# Patient Record
Sex: MALE | Race: WHITE | NOT HISPANIC OR LATINO | Employment: OTHER | URBAN - METROPOLITAN AREA
[De-identification: names, ages, dates, MRNs, and addresses within clinical notes are randomized per-mention and may not be internally consistent; named-entity substitution may affect disease eponyms.]

---

## 2012-01-26 ASSESSMENT — MIFFLIN-ST. JEOR: SCORE: 1333.92

## 2012-11-27 LAB
CREAT SERPL-MCNC: 1.11 MG/DL (ref 0.72–1.25)
GLUCOSE BLD-MCNC: 100 MG/DL (ref 65–100)

## 2017-05-25 ENCOUNTER — OFFICE VISIT - RIVER FALLS (OUTPATIENT)
Dept: FAMILY MEDICINE | Facility: CLINIC | Age: 82
End: 2017-05-25

## 2017-05-25 ENCOUNTER — COMMUNICATION - RIVER FALLS (OUTPATIENT)
Dept: FAMILY MEDICINE | Facility: CLINIC | Age: 82
End: 2017-05-25

## 2017-06-01 ENCOUNTER — OFFICE VISIT - RIVER FALLS (OUTPATIENT)
Dept: FAMILY MEDICINE | Facility: CLINIC | Age: 82
End: 2017-06-01

## 2017-06-01 ENCOUNTER — COMMUNICATION - RIVER FALLS (OUTPATIENT)
Dept: FAMILY MEDICINE | Facility: CLINIC | Age: 82
End: 2017-06-01

## 2017-06-01 ASSESSMENT — MIFFLIN-ST. JEOR: SCORE: 1421.8

## 2017-06-07 LAB
ANA SER QL IA: POSITIVE
ANA TITR SER IF: ABNORMAL TITER
CREAT SERPL-MCNC: 1.36 MG/DL (ref 0.7–1.11)
GLUCOSE BLD-MCNC: 142 MG/DL (ref 65–99)

## 2017-06-15 ENCOUNTER — OFFICE VISIT - RIVER FALLS (OUTPATIENT)
Dept: FAMILY MEDICINE | Facility: CLINIC | Age: 82
End: 2017-06-15

## 2017-08-08 ENCOUNTER — AMBULATORY - RIVER FALLS (OUTPATIENT)
Dept: FAMILY MEDICINE | Facility: CLINIC | Age: 82
End: 2017-08-08

## 2017-12-07 ENCOUNTER — OFFICE VISIT - RIVER FALLS (OUTPATIENT)
Dept: FAMILY MEDICINE | Facility: CLINIC | Age: 82
End: 2017-12-07

## 2017-12-07 ENCOUNTER — COMMUNICATION - RIVER FALLS (OUTPATIENT)
Dept: FAMILY MEDICINE | Facility: CLINIC | Age: 82
End: 2017-12-07

## 2017-12-07 ASSESSMENT — MIFFLIN-ST. JEOR: SCORE: 1424.52

## 2017-12-11 ENCOUNTER — OFFICE VISIT - RIVER FALLS (OUTPATIENT)
Dept: FAMILY MEDICINE | Facility: CLINIC | Age: 82
End: 2017-12-11

## 2017-12-11 ASSESSMENT — MIFFLIN-ST. JEOR: SCORE: 1431.78

## 2017-12-12 ENCOUNTER — OFFICE VISIT - RIVER FALLS (OUTPATIENT)
Dept: FAMILY MEDICINE | Facility: CLINIC | Age: 82
End: 2017-12-12

## 2017-12-15 ENCOUNTER — OFFICE VISIT - RIVER FALLS (OUTPATIENT)
Dept: FAMILY MEDICINE | Facility: CLINIC | Age: 82
End: 2017-12-15

## 2017-12-24 ENCOUNTER — OFFICE VISIT - RIVER FALLS (OUTPATIENT)
Dept: FAMILY MEDICINE | Facility: CLINIC | Age: 82
End: 2017-12-24

## 2018-04-21 ENCOUNTER — OFFICE VISIT - RIVER FALLS (OUTPATIENT)
Dept: FAMILY MEDICINE | Facility: CLINIC | Age: 83
End: 2018-04-21

## 2018-04-21 ASSESSMENT — MIFFLIN-ST. JEOR: SCORE: 1430.87

## 2018-06-04 ENCOUNTER — OFFICE VISIT - RIVER FALLS (OUTPATIENT)
Dept: FAMILY MEDICINE | Facility: CLINIC | Age: 83
End: 2018-06-04

## 2018-06-04 ASSESSMENT — MIFFLIN-ST. JEOR: SCORE: 1417.26

## 2018-10-11 ENCOUNTER — AMBULATORY - RIVER FALLS (OUTPATIENT)
Dept: FAMILY MEDICINE | Facility: CLINIC | Age: 83
End: 2018-10-11

## 2019-08-09 ENCOUNTER — OFFICE VISIT - RIVER FALLS (OUTPATIENT)
Dept: FAMILY MEDICINE | Facility: CLINIC | Age: 84
End: 2019-08-09

## 2019-09-04 ENCOUNTER — OFFICE VISIT - RIVER FALLS (OUTPATIENT)
Dept: FAMILY MEDICINE | Facility: CLINIC | Age: 84
End: 2019-09-04

## 2019-09-04 LAB
ERYTHROCYTE [DISTWIDTH] IN BLOOD BY AUTOMATED COUNT: 13.5 % (ref 11.5–15.5)
HCT VFR BLD AUTO: 36.3 % (ref 37–53)
HGB BLD-MCNC: 12.5 G/DL (ref 13.5–17.5)
MCH RBC QN AUTO: 30.9 PG (ref 26–34)
MCHC RBC AUTO-ENTMCNC: 34.4 G/DL (ref 32–36)
MCV RBC AUTO: 90 FL (ref 80–100)
PLATELET # BLD AUTO: 142 10*3/UL (ref 140–440)
PMV BLD: 9.8 FL (ref 6.5–11)
RBC # BLD AUTO: 4.05 10*6/UL (ref 4.3–5.9)
WBC # BLD AUTO: 5.3 10*3/UL (ref 4.5–11)

## 2019-11-12 ENCOUNTER — AMBULATORY - RIVER FALLS (OUTPATIENT)
Dept: FAMILY MEDICINE | Facility: CLINIC | Age: 84
End: 2019-11-12

## 2020-10-15 ENCOUNTER — AMBULATORY - RIVER FALLS (OUTPATIENT)
Dept: FAMILY MEDICINE | Facility: CLINIC | Age: 85
End: 2020-10-15

## 2021-09-08 ENCOUNTER — OFFICE VISIT - RIVER FALLS (OUTPATIENT)
Dept: FAMILY MEDICINE | Facility: CLINIC | Age: 86
End: 2021-09-08

## 2021-09-22 ENCOUNTER — OFFICE VISIT - RIVER FALLS (OUTPATIENT)
Dept: FAMILY MEDICINE | Facility: CLINIC | Age: 86
End: 2021-09-22

## 2022-02-12 VITALS
OXYGEN SATURATION: 98 % | SYSTOLIC BLOOD PRESSURE: 127 MMHG | WEIGHT: 178.8 LBS | SYSTOLIC BLOOD PRESSURE: 150 MMHG | HEIGHT: 68 IN | BODY MASS INDEX: 27.01 KG/M2 | DIASTOLIC BLOOD PRESSURE: 72 MMHG | BODY MASS INDEX: 26.29 KG/M2 | DIASTOLIC BLOOD PRESSURE: 63 MMHG | WEIGHT: 176.6 LBS | HEART RATE: 56 BPM | SYSTOLIC BLOOD PRESSURE: 132 MMHG | DIASTOLIC BLOOD PRESSURE: 88 MMHG | TEMPERATURE: 98.7 F | WEIGHT: 174.2 LBS | SYSTOLIC BLOOD PRESSURE: 134 MMHG | HEART RATE: 92 BPM | HEART RATE: 84 BPM | HEART RATE: 72 BPM | HEIGHT: 68 IN | DIASTOLIC BLOOD PRESSURE: 72 MMHG | BODY MASS INDEX: 26.99 KG/M2 | BODY MASS INDEX: 26.76 KG/M2 | TEMPERATURE: 98.4 F | TEMPERATURE: 97.8 F | WEIGHT: 178.2 LBS | TEMPERATURE: 98.9 F

## 2022-02-12 VITALS
TEMPERATURE: 97.6 F | BODY MASS INDEX: 26.28 KG/M2 | HEART RATE: 72 BPM | SYSTOLIC BLOOD PRESSURE: 120 MMHG | WEIGHT: 174.12 LBS | DIASTOLIC BLOOD PRESSURE: 72 MMHG

## 2022-02-12 VITALS
WEIGHT: 172.8 LBS | BODY MASS INDEX: 26.08 KG/M2 | HEART RATE: 69 BPM | TEMPERATURE: 97.7 F | OXYGEN SATURATION: 98 % | SYSTOLIC BLOOD PRESSURE: 100 MMHG | DIASTOLIC BLOOD PRESSURE: 58 MMHG

## 2022-02-12 VITALS
TEMPERATURE: 96.3 F | SYSTOLIC BLOOD PRESSURE: 136 MMHG | HEIGHT: 67 IN | WEIGHT: 161 LBS | BODY MASS INDEX: 25.27 KG/M2 | DIASTOLIC BLOOD PRESSURE: 72 MMHG | HEART RATE: 68 BPM

## 2022-02-12 VITALS
DIASTOLIC BLOOD PRESSURE: 70 MMHG | HEART RATE: 72 BPM | TEMPERATURE: 97.8 F | WEIGHT: 171.8 LBS | SYSTOLIC BLOOD PRESSURE: 140 MMHG

## 2022-02-12 VITALS
DIASTOLIC BLOOD PRESSURE: 60 MMHG | TEMPERATURE: 99 F | WEIGHT: 176 LBS | HEIGHT: 68 IN | BODY MASS INDEX: 26.67 KG/M2 | SYSTOLIC BLOOD PRESSURE: 140 MMHG | HEART RATE: 76 BPM

## 2022-02-12 VITALS
BODY MASS INDEX: 26.72 KG/M2 | WEIGHT: 177 LBS | DIASTOLIC BLOOD PRESSURE: 72 MMHG | OXYGEN SATURATION: 100 % | BODY MASS INDEX: 26.82 KG/M2 | SYSTOLIC BLOOD PRESSURE: 144 MMHG | SYSTOLIC BLOOD PRESSURE: 130 MMHG | OXYGEN SATURATION: 99 % | TEMPERATURE: 97.2 F | DIASTOLIC BLOOD PRESSURE: 70 MMHG | HEART RATE: 67 BPM | HEART RATE: 61 BPM | TEMPERATURE: 97.5 F | WEIGHT: 177.7 LBS

## 2022-02-12 VITALS
WEIGHT: 178 LBS | OXYGEN SATURATION: 98 % | HEIGHT: 68 IN | SYSTOLIC BLOOD PRESSURE: 122 MMHG | HEART RATE: 63 BPM | BODY MASS INDEX: 26.98 KG/M2 | TEMPERATURE: 96.8 F | DIASTOLIC BLOOD PRESSURE: 60 MMHG

## 2022-02-12 VITALS
OXYGEN SATURATION: 98 % | SYSTOLIC BLOOD PRESSURE: 128 MMHG | WEIGHT: 175 LBS | HEIGHT: 68 IN | TEMPERATURE: 97.1 F | HEART RATE: 73 BPM | DIASTOLIC BLOOD PRESSURE: 78 MMHG | BODY MASS INDEX: 26.52 KG/M2

## 2022-02-16 NOTE — PROGRESS NOTES
Patient:   ERICA MTZ SR            MRN: [id number]            FIN: [id number]               Age:   89 years     Sex:  Male     :  9/15/1928   Associated Diagnoses:   Strain of right calf muscle   Author:   Leonardo Gilbert MD      Visit Information      Date of Service: 2021 10:46 am  Performing Location: [facility]  Encounter#: [id number]      Primary Care Provider (PCP):  Leonardo Gilbert MD    NPI# [id number]      Referring Provider:  Leonardo Gilbert MD    NPI# [id number]      Chief Complaint   2021 10:48 AM CDT    Patient here today c/o pain in R calf x 2 weeks  (Modified)             Additional Information:No additional information recorded during visit.   Chief complaint and symptoms as noted above and confirmed with patient.  Recent lab and diagnostic studies reviewed with patient      History of Present Illness   Erica presents to clinic after emergency room visit in [place] this past week due to acute right calf pain now for over 1 week.  Does not describe any particular injury or trauma.  Pain primarily localized in the calf muscles.  ER work-up reviewed demonstrating a normal venous duplex study and x-ray of tibia-fibula without any evidence of fracture.  He was advised to follow-up for suspected musculoskeletal strain.  He was provided calf stretching exercises which he states only aggravates the pain.  He has been advised not to use any NSAIDs related to his chronic health conditions and antiplatelet therapy.         Review of Systems   Constitutional:  No fever, No chills.    Eye:  Negative except as documented in history of present illness.    Ear/Nose/Mouth/Throat:  Negative except as documented in history of present illness.    Respiratory:  No shortness of breath.    Cardiovascular:  No chest pain, No palpitations, No peripheral edema, No syncope.    Gastrointestinal:  No nausea, No vomiting, No abdominal pain.    Genitourinary   Hematology/Lymphatics:  Negative except as  documented in history of present illness.    Endocrine   Immunologic   Musculoskeletal:  Muscle pain, Decreased range of motion, No joint pain, No trauma.    Neurologic:  Alert and oriented X4, No numbness, No tingling, No headache.       Health Status   Allergies:    Nonallergic Reactions (Selected)  Severity Not Documented  Augmentin (Diarrhea)   Medications:  (Selected)   Prescriptions  Prescribed  Diprolene 0.05% topical ointment: 1 karl, TOP, BID, # 50 g, 0 Refill(s), Type: Maintenance, Pharmacy: [facility], 1 karl top bid  Lidocaine/Benadryl/Maalox 1:1:1: Lidocaine/Benadryl/Maalox 1:1:1, See Instructions, Instructions: 5-10cc swish and spit TID prn for mouth pain, Supply, # 200 mL, 0 Refill(s), Type: Maintenance, 5-10cc swish and spit TID prn for mouth pain  Metoprolol Succinate ER 50 mg oral tablet, extended release: See Instructions, Instructions: TAKE ONE TABLET BY MOUTH EVERY DAY - ALONG WITH A 25MG TABLET, # 90 unknown unit, 1 Refill(s), Type: Maintenance, Pharmacy: [facility]  Documented Medications  Documented  Flomax 0.4 mg oral capsule: 1 cap(s), PO, Daily, # 30 cap(s), 0  Metoprolol Succinate ER 25 mg oral tablet, extended release: = 1 tab(s) ( 25 mg ), Oral, daily, 0 Refill(s), Type: Soft Stop  Omega-3 oral capsule: 0 Refill(s), Type: Maintenance  Tums: See Instructions, Instructions: 250 mg chewed QOD, 0 Refill(s), Type: Maintenance  Vitamin D3 1000 intl units oral tablet: 1 tab(s) ( 1,000 International Unit ), po, bid, 0 Refill(s), Type: Maintenance  amLODIPine 2.5 mg oral tablet: 0 Refill(s), Type: Soft Stop  aspirin 81 mg oral tablet: 1 tab(s) ( 81 mg ), PO, Daily, 0 Refill(s), Type: Maintenance  clobetasol 0.05% topical ointment: 1 karl, top, bid, 0 Refill(s), Type: Maintenance  finasteride 5 mg oral tablet: 1 tab(s), PO, Daily, # 30 tab(s), 0 Refill(s)  hydrocortisone 2.5% topical cream: 1 karl, TOP, bid, # 20 g, 0 Refill(s), Type:  Maintenance  isosorbide mononitrate 30 mg oral tablet, extended release: 2 tab(s) ( 60 mg ), po, qam, 0 Refill(s), Type: Maintenance  nitroglycerin 0.4 mg sublingual tablet: 0 Refill(s), Type: Soft Stop  ranitidine 150 mg oral tablet: See Instructions, Instructions: 0.5 tab, 0 Refill(s), Type: Maintenance  simvastatin 10 mg oral tablet: 1 tab(s) ( 10 mg ), PO, Once a day (at bedtime), # 30 tab(s), 0 Refill(s), Type: Maintenance  triamcinolone 0.1% topical cream: 1 karl, top, bid, 0 Refill(s), Type: Maintenance,    Medications          *denotes recorded medication          Lidocaine/Benadryl/Maalox 1:1:1: See Instructions, 5-10cc swish and spit TID prn for mouth pain, 200 mL, 0 Refill(s).          *amLODIPine 2.5 mg oral tablet: 0 Refill(s).          *aspirin 81 mg oral tablet: 81 mg, 1 tab(s), PO, Daily, 0 Refill(s).          Diprolene 0.05% topical ointment: 1 karl, TOP, BID, 50 g, 0 Refill(s).          *Tums: See Instructions, 250 mg chewed QOD, 0 Refill(s).          *Vitamin D3 1000 intl units oral tablet: 1,000 International Unit, 1 tab(s), po, bid, 0 Refill(s).          *clobetasol 0.05% topical ointment: 1 karl, top, bid, 0 Refill(s).          *finasteride 5 mg oral tablet: 1 tab(s), PO, Daily, 30 tab(s).          *hydrocortisone 2.5% topical cream: 1 karl, TOP, bid, 20 g, 0 Refill(s).          *isosorbide mononitrate 30 mg oral tablet, extended release: 60 mg, 2 tab(s), po, qam, 0 Refill(s).          Metoprolol Succinate ER 50 mg oral tablet, extended release: See Instructions, TAKE ONE TABLET BY MOUTH EVERY DAY - ALONG WITH A 25MG TABLET, 90 unknown unit, 1 Refill(s).          *Metoprolol Succinate ER 25 mg oral tablet, extended release: 25 mg, 1 tab(s), Oral, daily, 0 Refill(s).          *nitroglycerin 0.4 mg sublingual tablet: 0 Refill(s).          *Omega-3 oral capsule: 0 Refill(s).          *ranitidine 150 mg oral tablet: See Instructions, 0.5 tab, 0 Refill(s).          *simvastatin 10 mg oral tablet: 10 mg,  1 tab(s), PO, Once a day (at bedtime), 30 tab(s), 0 Refill(s).          *Flomax 0.4 mg oral capsule: 1 cap(s), PO, Daily, 30 cap(s).          *triamcinolone 0.1% topical cream: 1 karl, top, bid, 0 Refill(s).       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  Amaurosis fugax of left eye (362.34): Onset on 2002 at 74 years.  Osteopenia (733.90): Onset in  at 73 years.  PMR (Polymyalgia Rheumatica) (725): Onset in  at 69 years.  BPH (Benign Prostatic Hypertrophy) (600.00)  AAA (Abdominal Aortic Aneurysm) (441.4)   Family History:    CA - Lung cancer  Father ()  Bladder cancer  Mother ()     Procedure history:    Cholecystectomy (60167325) on 2007 at 78 Years.  Upper GI endoscopy (5112374277) in  at 67 Years.   Social History:        Electronic Cigarette/Vaping Assessment            Electronic Cigarette Use: Never.      Tobacco Assessment: Denies Tobacco Use            Past            Former smoker, quit more than 30 days ago      Employment and Education Assessment            Retired      Home and Environment Assessment            Marital status: .        Physical Examination   vital signs stable, as noted above   Vital Signs   2021 10:48 AM CDT Temperature Tympanic 97.2 DegF  LOW    Peripheral Pulse Rate 67 bpm    HR Method Electronic    Systolic Blood Pressure 130 mmHg    Diastolic Blood Pressure 72 mmHg    Mean Arterial Pressure 91 mmHg    BP Site Right arm     BP Method Electronic    Oxygen Saturation 99 %      Measurements from flowsheet : Measurements   9/8/2021 10:48 AM CDT    Weight Measured - Standard                177.0 lb     General:  Alert and oriented, No acute distress.    Neck:  Not supple.    Respiratory:  Respirations are non-labored.    Cardiovascular:  Normal rate, No edema.    Musculoskeletal:  Normal strength, Negative Montgomery sign.  Non-tender Achilles.  Focal pains in mid-posterior calf muscle.  Induced pains with calf flexion.    Neurologic:  Alert, Oriented, Normal motor function, No focal deficits.    Cognition and Speech:  Oriented, Speech clear and coherent.    Psychiatric:  Appropriate mood & affect.       Review / Management   Results review      Impression and Plan   Diagnosis     Strain of right calf muscle (PTP78-PP S86.811A).     - [facility] ED results reviewed with patient   - venous duplex without DVT   - reportedly tib-fib xrays without fracture   - Achilles tendon intact based on Montgomery test - no Achilles tendinitis   - suspect acute calf muscle strain     - reassurance given, needing more rest; no need for Cam boot     - referral to PT

## 2022-02-16 NOTE — TELEPHONE ENCOUNTER
"---------------------  From: Keisha Bowie CMA   Sent: 10/14/2019 8:44:27 AM CDT  Subject: question     Pt LM at 0827 asking about xray last month that showed a \"spot on his lung\" and he wanted more clarification. I called back at 0838 and pt just wanted to write down what \"spot\" was called. I informed him it was a pulmonary nodule. Pt wrote it down and needed nothing else. He has his appt at [place] coming up soon and does have imaging disc for them to review.  "

## 2022-02-16 NOTE — PROGRESS NOTES
Patient:   LILIANA MTZ SR            MRN: [id number]            FIN: [id number]               Age:   89 years     Sex:  Male     :  9/15/1928   Associated Diagnoses:   Accidental fall; Pain in right thigh; Pain of right calf   Author:   Greg Arnold MD      Chief Complaint   2018 2:28 PM CDT     pt here for fu fall, he tripped over a curb and was able to catch himself before he fell all the way down, he did this on Friday, he has pain in his right thigh can calf, causing him to be in pain which has worsened, no back pain or knee pain        History of Present Illness   see chief complaint as noted above and confirmed with the patient     89 year old male presents today after a fall that occurred on Friday. He tripped his right foot over a curb near a car, he managed to catch himself before he completely fell. He complains of pain in his right thigh and right calf. He denies pain in the right knee, denies pain on the left side, denies right arm pain and denies right wrist pain. He denies shortness of breath, denies chest pains or pressures. He has had right hip pain ongoing before the fall, also has had complaint of right thigh pain in the past, he has a history of Polymyalgia Rheumatica, yet says he has seen a Rheumatologist and he believes he does not have PMR, and his present symptoms do not feel like PMR. He mentions having Prednisone and Dilaudid by  and say's this worked well for him. He also mentions having Morphine in the hospital, he feels these have helped.       Review of Systems   Constitutional:  No fever.    Respiratory:  No shortness of breath.    Cardiovascular:  No chest pain.    Gastrointestinal:  No nausea, No vomiting, No diarrhea.    Musculoskeletal:  right thigh and calf pain , denies right knee pain , No back pain.    Integumentary:  No rash.    Neurologic:  Alert and oriented X4, No headache.       Health Status   Allergies:    Nonallergic Reactions  (Selected)  Severity Not Documented  Augmentin (Diarrhea)   Medications:  (Selected)   Prescriptions  Prescribed  Claritin-D 12 Hour oral tablet, extended release: 1 tab(s), PO, q12hr, # 14 tab(s), 0 Refill(s), Type: Maintenance  Diprolene 0.05% topical ointment: 1 karl, TOP, BID, # 50 g, 0 Refill(s), Type: Maintenance, Pharmacy: [facility], 1 karl top bid  Lidocaine/Benadryl/Maalox 1:1:1: Lidocaine/Benadryl/Maalox 1:1:1, See Instructions, Instructions: 5-10cc swish and spit TID prn for mouth pain, Supply, # 200 mL, 0 Refill(s), Type: Maintenance, 5-10cc swish and spit TID prn for mouth pain  Metoprolol Succinate ER 50 mg oral tablet, extended release: See Instructions, Instructions: TAKE ONE TABLET BY MOUTH EVERY DAY - ALONG WITH A 25MG TABLET, # 90 unknown unit, 1 Refill(s), Type: Maintenance, Pharmacy: [facility]  ZyrTEC 10 mg oral tablet: 1 tab(s) ( 10 mg ), PO, Daily, # 30 tab(s), 3 Refill(s), Type: Maintenance, Pharmacy: [facility], 1 tab(s) po daily  amLODIPine 5 mg oral tablet: See Instructions, Instructions: TAKE ONE TABLET BY MOUTH EVERY DAY, # 90 unknown unit, Type: Soft Stop, Pharmacy: [facility]  Documented Medications  Documented  Flomax 0.4 mg oral capsule: 1 cap(s), PO, Daily, # 30 cap(s), 0  Nitrostat 0.4 mg sublingual tablet: 1 tab(s) ( 0.4 mg ), SL, prn, PRN: for chest pain, Type: Maintenance  Omega-3 oral capsule: 0 Refill(s), Type: Maintenance  Plavix 75 mg oral tablet: 1 tab(s), PO, Daily, # 30 tab(s), 0  Tums: See Instructions, Instructions: 250 mg chewed QOD, 0 Refill(s), Type: Maintenance  Vitamin D3 1000 intl units oral tablet: 1 tab(s) ( 1,000 International Unit ), po, bid, 0 Refill(s), Type: Maintenance  aspirin 81 mg oral tablet: 1 tab(s) ( 81 mg ), PO, Daily, 0 Refill(s), Type: Maintenance  clobetasol 0.05% topical ointment: 1 karl, top, bid, 0 Refill(s), Type: Maintenance  finasteride 5 mg  oral tablet: 1 tab(s), PO, Daily, # 30 tab(s), 0 Refill(s)  hydrocortisone 2.5% topical cream: 1 karl, TOP, bid, # 20 g, 0 Refill(s), Type: Maintenance  isosorbide mononitrate 30 mg oral tablet, extended release: 2 tab(s) ( 60 mg ), po, qam, 0 Refill(s), Type: Maintenance  ranitidine 150 mg oral tablet: 1 tab(s) ( 150 mg ), po, daily, 0 Refill(s), Type: Maintenance  simvastatin 10 mg oral tablet: 1 tab(s) ( 10 mg ), PO, Once a day (at bedtime), # 30 tab(s), 0 Refill(s), Type: Maintenance  triamcinolone 0.1% topical cream: 1 karl, top, bid, 0 Refill(s), Type: Maintenance   Problem list:    All Problems  Varicose Veins of Leg / ICD-9-.9 / Confirmed  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  Amaurosis fugax of left eye (362.34): Onset on 2002 at 74 years.  Osteopenia (733.90): Onset in  at 73 years.  PMR (Polymyalgia Rheumatica) (725): Onset in  at 69 years.  BPH (Benign Prostatic Hypertrophy) (600.00)  AAA (Abdominal Aortic Aneurysm) (441.4)   Family History:    CA - Lung cancer  Father ()  Bladder cancer  Mother ()     Procedure history:    Cholecystectomy (50362728) on 2007 at 78 Years.  Upper GI endoscopy (1364439068) in  at 67 Years.   Social History:        Tobacco Assessment: Denies Tobacco Use            Past                     Comments:                      2011 - Greta James                     Smoked for a brief period of time. Quit back in the 1950s.      Employment and Education Assessment            Retired      Home and  Environment Assessment            Marital status: .        Physical Examination   Vital Signs   6/4/2018 2:28 PM CDT Temperature Tympanic 97.1 DegF  LOW    Peripheral Pulse Rate 73 bpm    Pulse Site Radial artery    Systolic Blood Pressure 128 mmHg    Diastolic Blood Pressure 78 mmHg    Mean Arterial Pressure 95 mmHg    BP Site Right arm    Oxygen Saturation 98 %      Measurements from flowsheet : Measurements   6/4/2018 2:28 PM CDT Height Measured - Standard 68.25 in    Weight Measured - Standard 175 lb    BSA 1.95 m2    Body Mass Index 26.41 kg/m2  HI      General:  Alert and oriented, No acute distress.    Eye:  Pupils are equal, round and reactive to light, Normal conjunctiva.    HENT:  Oral mucosa is moist.    Neck:  Supple.    Respiratory:  Respirations are non-labored.    Cardiovascular:  Normal rate, Regular rhythm, No edema.    Gastrointestinal:  Non-distended.    Musculoskeletal:  he is using a cane to walk, he struggles to walk normally, he is a bit stumbly and when he first rises from a chair it takes him a few seconds to get his balance stablized before walking. .    Integumentary:  Warm, No rash.    Psychiatric:  Cooperative, Appropriate mood & affect, Normal judgment.       Review / Management   Results review      Impression and Plan       Diagnosis     Accidental fall (JTT12-GD W19.XXXA).     Pain in right thigh (IRJ48-BF M79.651).     Pain of right calf (KPW35-NL M79.661).     Summary:  Offered X-rays-he denies at this time, he feels if he is able to break the pain he feels he will be ok.     his goal is to recieve the treatment that have worked the last 2 times he had pain and mobility limitations.   He is processing info well and seems to understand my concerns about balance and falls on short term narcotics.    Offered to do some lab work and he declines at this time.     He feels some pain medication and Prednisone will help him get through this. He will return if it does not.      Offered physical therapy and he declines at this time.     Discussed that pain medications will make him sleepy and at more risk for falls. Advised if he take the pain medication he stay home to rest and get better. .    I, Coco Leblanc Medical Assistant acted solely as a scribe for, and in presence of Dr. Greg Arnold who performed the services.

## 2022-02-16 NOTE — NURSING NOTE
Comprehensive Intake Entered On:  9/8/2021 10:56 AM CDT    Performed On:  9/8/2021 10:48 AM CDT by Xochitl Aguayo               Summary   Chief Complaint :   Patient here today c/o pain in R calf x 2 weeks   Xochitl Aguayo - 9/8/2021 10:56 AM CDT     Advance Directive :   Yes   Weight Measured :   177.0 lb(Converted to: 177 lb 0 oz, 80.286 kg)    Systolic Blood Pressure :   130 mmHg   Diastolic Blood Pressure :   72 mmHg   Mean Arterial Pressure :   91 mmHg   Peripheral Pulse Rate :   67 bpm   BP Site :   Right arm   BP Method :   Electronic   HR Method :   Electronic   Temperature Tympanic :   97.2 DegF(Converted to: 36.2 DegC)  (LOW)    Oxygen Saturation :   99 %   Xochitl Aguayo - 9/8/2021 10:48 AM CDT   Health Status   Allergies Verified? :   Yes   Medication History Verified? :   Yes   Medical History Verified? :   Yes   Pre-Visit Planning Status :   Completed   Tobacco Use? :   Former smoker   Xochitl Aguayo - 9/8/2021 10:48 AM CDT   Consents   Consent for Immunization Exchange :   Consent Granted   Consent for Immunizations to Providers :   Consent Granted   Xochitl Aguayo - 9/8/2021 10:48 AM CDT   Meds / Allergies   (As Of: 9/8/2021 10:56:13 AM CDT)   Allergies (Active)   Augmentin  Estimated Onset Date:   Unspecified ; Reactions:   diarrhea ; Created By:   Ashia Hooker CMA; Reaction Status:   Active ; Category:   Drug ; Substance:   Augmentin ; Type:   Sensitivity ; Updated By:   Ashia Hooker CMA; Reviewed Date:   9/8/2021 10:53 AM CDT        Medication List   (As Of: 9/8/2021 10:56:13 AM CDT)   Prescription/Discharge Order    Miscellaneous Rx Supply  :   Miscellaneous Rx Supply ; Status:   Prescribed ; Ordered As Mnemonic:   Lidocaine/Benadryl/Maalox 1:1:1 ; Simple Display Line:   See Instructions, 5-10cc swish and spit TID prn for mouth pain, 200 mL, 0 Refill(s) ; Ordering Provider:   Leonardo Gilbert MD; Catalog Code:   Miscellaneous Rx Supply ; Order Dt/Tm:   12/7/2017 2:12:39 PM CST           metoprolol  :   metoprolol ; Status:   Prescribed ; Ordered As Mnemonic:   Metoprolol Succinate ER 50 mg oral tablet, extended release ; Simple Display Line:   See Instructions, TAKE ONE TABLET BY MOUTH EVERY DAY - ALONG WITH A 25MG TABLET, 90 unknown unit, 1 Refill(s) ; Ordering Provider:   Leonardo Gilbert MD; Catalog Code:   metoprolol ; Order Dt/Tm:   9/5/2017 1:41:57 PM CDT          betamethasone topical  :   betamethasone topical ; Status:   Prescribed ; Ordered As Mnemonic:   Diprolene 0.05% topical ointment ; Simple Display Line:   1 karl, TOP, BID, 50 g, 0 Refill(s) ; Ordering Provider:   Leonardo Gilbert MD; Catalog Code:   betamethasone topical ; Order Dt/Tm:   6/15/2017 5:51:16 PM CDT            Home Meds    triamcinolone topical  :   triamcinolone topical ; Status:   Documented ; Ordered As Mnemonic:   triamcinolone 0.1% topical cream ; Simple Display Line:   1 karl, top, bid, 0 Refill(s) ; Catalog Code:   triamcinolone topical ; Order Dt/Tm:   2/4/2016 3:02:34 PM CST          tamsulosin  :   tamsulosin ; Status:   Documented ; Ordered As Mnemonic:   Flomax 0.4 mg oral capsule ; Simple Display Line:   1 cap(s), PO, Daily, 30 cap(s) ; Catalog Code:   tamsulosin ; Order Dt/Tm:   2/9/2010 11:12:52 AM CST          simvastatin  :   simvastatin ; Status:   Documented ; Ordered As Mnemonic:   simvastatin 10 mg oral tablet ; Simple Display Line:   10 mg, 1 tab(s), PO, Once a day (at bedtime), 30 tab(s), 0 Refill(s) ; Catalog Code:   simvastatin ; Order Dt/Tm:   11/9/2016 2:12:50 PM CST          raNITIdine  :   raNITIdine ; Status:   Documented ; Ordered As Mnemonic:   ranitidine 150 mg oral tablet ; Simple Display Line:   See Instructions, 0.5 tab, 0 Refill(s) ; Catalog Code:   raNITIdine ; Order Dt/Tm:   1/31/2014 10:20:15 AM CST          omega-3 polyunsaturated fatty acids  :   omega-3 polyunsaturated fatty acids ; Status:   Documented ; Ordered As Mnemonic:   Omega-3 oral capsule ; Simple Display Line:   0  Refill(s) ; Catalog Code:   omega-3 polyunsaturated fatty acids ; Order Dt/Tm:   11/9/2016 2:12:41 PM CST          nitroglycerin  :   nitroglycerin ; Status:   Documented ; Ordered As Mnemonic:   nitroglycerin 0.4 mg sublingual tablet ; Simple Display Line:   0 Refill(s) ; Catalog Code:   nitroglycerin ; Order Dt/Tm:   8/9/2019 11:15:10 AM CDT ; Comment:   Responsible Provider: PATSY          metoprolol  :   metoprolol ; Status:   Documented ; Ordered As Mnemonic:   Metoprolol Succinate ER 25 mg oral tablet, extended release ; Simple Display Line:   25 mg, 1 tab(s), Oral, daily, 0 Refill(s) ; Catalog Code:   metoprolol ; Order Dt/Tm:   8/9/2019 11:15:07 AM CDT ; Comment:   Responsible Provider: CABRERA          isosorbide mononitrate  :   isosorbide mononitrate ; Status:   Documented ; Ordered As Mnemonic:   isosorbide mononitrate 30 mg oral tablet, extended release ; Simple Display Line:   60 mg, 2 tab(s), po, qam, 0 Refill(s) ; Catalog Code:   isosorbide mononitrate ; Order Dt/Tm:   11/26/2012 2:03:44 PM CST          hydrocortisone topical  :   hydrocortisone topical ; Status:   Documented ; Ordered As Mnemonic:   hydrocortisone 2.5% topical cream ; Simple Display Line:   1 karl, TOP, bid, 20 g, 0 Refill(s) ; Catalog Code:   hydrocortisone topical ; Order Dt/Tm:   2/4/2016 3:00:37 PM CST          finasteride  :   finasteride ; Status:   Documented ; Ordered As Mnemonic:   finasteride 5 mg oral tablet ; Simple Display Line:   1 tab(s), PO, Daily, 30 tab(s) ; Catalog Code:   finasteride ; Order Dt/Tm:   8/20/2010 2:41:25 PM CDT          clobetasol topical  :   clobetasol topical ; Status:   Documented ; Ordered As Mnemonic:   clobetasol 0.05% topical ointment ; Simple Display Line:   1 karl, top, bid, 0 Refill(s) ; Catalog Code:   clobetasol topical ; Order Dt/Tm:   2/4/2016 2:59:37 PM CST          cholecalciferol  :   cholecalciferol ; Status:   Documented ; Ordered As Mnemonic:   Vitamin D3 1000 intl units oral  tablet ; Simple Display Line:   1,000 International Unit, 1 tab(s), po, bid, 0 Refill(s) ; Catalog Code:   cholecalciferol ; Order Dt/Tm:   11/9/2016 2:11:48 PM CST          calcium carbonate  :   calcium carbonate ; Status:   Documented ; Ordered As Mnemonic:   Tums ; Simple Display Line:   See Instructions, 250 mg chewed QOD, 0 Refill(s) ; Catalog Code:   calcium carbonate ; Order Dt/Tm:   2/4/2016 3:03:08 PM CST          aspirin  :   aspirin ; Status:   Documented ; Ordered As Mnemonic:   aspirin 81 mg oral tablet ; Simple Display Line:   81 mg, 1 tab(s), PO, Daily, 0 Refill(s) ; Catalog Code:   aspirin ; Order Dt/Tm:   2/4/2016 2:59:08 PM CST          amlodipine  :   amlodipine ; Status:   Documented ; Ordered As Mnemonic:   amLODIPine 2.5 mg oral tablet ; Simple Display Line:   0 Refill(s) ; Catalog Code:   amLODIPine ; Order Dt/Tm:   8/9/2019 11:14:53 AM CDT ; Comment:   Responsible Provider: CABRERA,            Social History   Social History   (As Of: 9/8/2021 10:56:14 AM CDT)   Tobacco:  Denies Tobacco Use      Past   (Last Updated: 5/3/2011 3:01:20 PM CDT by Natacha Tuttle MA)    Comments:  5/6/2011 12:55 PM - Greta James: Smoked for a brief period of time. Quit back in the 1950s.   (Last Updated: 5/6/2011 12:55:25 PM CDT by Greta James)   Former smoker, quit more than 30 days ago   (Last Updated: 9/8/2021 10:49:13 AM CDT by Xochitl Aguayo)          Electronic Cigarette/Vaping:        Electronic Cigarette Use: Never.   (Last Updated: 9/8/2021 10:49:23 AM CDT by Xochitl Aguayo)          Employment/School:        Retired   (Last Updated: 5/6/2011 1:15:51 PM CDT by Greta James)          Home/Environment:        Marital status: .   (Last Updated: 5/6/2011 1:23:50 PM CDT by Greta James)

## 2022-02-16 NOTE — PROGRESS NOTES
Patient:   ERICA MTZ SR            MRN: [id number]            FIN: [id number]               Age:   89 years     Sex:  Male     :  9/15/1928   Associated Diagnoses:   AAA (Abdominal Aortic Aneurysm); Coronary artery disease; Hypertension; PMR (Polymyalgia Rheumatica)   Author:   Leonardo Gilbert MD      Visit Information      Date of Service: 2019 09:38 am  Performing Location: [facility]  Encounter#: [id number]      Primary Care Provider (PCP):  Leonardo Gilbert MD    NPI# [id number]      Referring Provider:  Leonardo Gilbert MD, NPI# [id number]      Chief Complaint   2019 9:44 AM CDT     Not feeling well x 1wk  c/o chest and nasal congestion, nausea/stomach pains, diarrhea starting, achy,weak, cough-productive at time with near syncope with cough, sweats              Additional Information:No additional information recorded during visit.   Chief complaint and symptoms as noted above and confirmed with patient.  Recent lab and diagnostic studies reviewed with patient      History of Present Illness   2016: Erica presents to clinic with several concerns.  He is primarily followed through the general medicine clinic at [place] by Dr. Krishna Man.  He has a prior history of suspected coronary artery disease related to a positive nuclear stress test in .  He has been treated with medical management and has never undergone diagnostic heart catheterization.  He also has a suspected history of cerebrovascular disease and is maintained on both aspirin and Plavix.  Overall he is a very intelligent man who has good insight into his underlying medical care.  His primary concern is his blood pressure lability.  He provides meticulous detail of blood pressure readings at home.  His systolic pressures in the morning and the evenings are consistently in the 150s-160s range.  He shows morning blood pressures after breakfast (between 8 and 11 AM) with systolic readings between 90s-110s.  He describes having  orthostatic symptoms on a fairly predictable basis during this timeframe.  Currently he takes isosorbide mononitrate 30 mg in the morning and Toprol-XL 25 mg in the evening.  He also describes having a chest tightness when attempting to shovel snow on his driveway.  He does not feel this is angina.  He feels that symptoms are primarily brought on by the cold weather.  Denies any active chest pains at this time.  He does have nitroglycerin tablets available at home though has never felt he needed them.      9/4/2019: Presents with approximate 1 week history of general malaise and fatigue with upper respiratory tract symptoms.  Describes chills and sweats.  No objective fever.  Nonproductive cough.  Describes some coughing fits at the point that he feels like he could pass out.  He has noticed similar symptoms in residence at [facility] where he visits daily to see Brandee.  No current shortness of breath.  Historically does not associate having regular allergy symptoms.         Review of Systems   Constitutional:  Chills, Sweats, Fatigue, Decreased activity.    Eye:  Negative except as documented in history of present illness.    Ear/Nose/Mouth/Throat:  Nasal congestion, Sore throat.    Respiratory:  Cough, No shortness of breath.    Cardiovascular   Gastrointestinal:  No nausea, No vomiting, No diarrhea, No abdominal pain.    Genitourinary:  No dysuria, No hematuria.    Hematology/Lymphatics:  Negative except as documented in history of present illness.    Endocrine:  No excessive thirst, No polyuria.    Immunologic:  Malaise, No recurrent fevers.    Musculoskeletal:  No back pain, No joint pain, No muscle pain, No decreased range of motion, No trauma.    Integumentary:  No rash, No pruritus.    Neurologic:  Alert and oriented X4, No numbness, No tingling, No headache.       Health Status   Allergies:    Nonallergic Reactions (Selected)  Severity Not Documented  Augmentin (Diarrhea)   Medications:  (Selected)    Prescriptions  Prescribed  Atrovent 42 mcg/inh nasal spray: 2 spray(s), Nasal, qid, x 10 day(s), PRN: as needed for cold symptoms, # 1 EA, 1 Refill(s), Type: Acute, Pharmacy: [facility], 2 spray(s) Nasal qid,x10 day(s),PRN:as needed for cold symptoms  Diprolene 0.05% topical ointment: 1 akrl, TOP, BID, # 50 g, 0 Refill(s), Type: Maintenance, Pharmacy: [facility], 1 karl top bid  Lidocaine/Benadryl/Maalox 1:1:1: Lidocaine/Benadryl/Maalox 1:1:1, See Instructions, Instructions: 5-10cc swish and spit TID prn for mouth pain, Supply, # 200 mL, 0 Refill(s), Type: Maintenance, 5-10cc swish and spit TID prn for mouth pain  Metoprolol Succinate ER 50 mg oral tablet, extended release: See Instructions, Instructions: TAKE ONE TABLET BY MOUTH EVERY DAY - ALONG WITH A 25MG TABLET, # 90 unknown unit, 1 Refill(s), Type: Maintenance, Pharmacy: [facility]  Tessalon Perles 100 mg oral capsule: = 1 cap(s) ( 100 mg ), Oral, tid, x 10 day(s), PRN: as needed for cough, # 30 cap(s), 1 Refill(s), Type: Acute, Pharmacy: [facility], 1 cap(s) Oral tid,x10 day(s),PRN:as needed for cough  Documented Medications  Documented  Flomax 0.4 mg oral capsule: 1 cap(s), PO, Daily, # 30 cap(s), 0  Metoprolol Succinate ER 25 mg oral tablet, extended release: = 1 tab(s) ( 25 mg ), Oral, daily, 0 Refill(s), Type: Soft Stop  Omega-3 oral capsule: 0 Refill(s), Type: Maintenance  Tums: See Instructions, Instructions: 250 mg chewed QOD, 0 Refill(s), Type: Maintenance  Vitamin D3 1000 intl units oral tablet: 1 tab(s) ( 1,000 International Unit ), po, bid, 0 Refill(s), Type: Maintenance  amLODIPine 2.5 mg oral tablet: 0 Refill(s), Type: Soft Stop  aspirin 81 mg oral tablet: 1 tab(s) ( 81 mg ), PO, Daily, 0 Refill(s), Type: Maintenance  clobetasol 0.05% topical ointment: 1 karl, top, bid, 0 Refill(s), Type: Maintenance  finasteride 5 mg oral tablet: 1 tab(s), PO,  Daily, # 30 tab(s), 0 Refill(s)  hydrocortisone 2.5% topical cream: 1 karl, TOP, bid, # 20 g, 0 Refill(s), Type: Maintenance  isosorbide mononitrate 30 mg oral tablet, extended release: 2 tab(s) ( 60 mg ), po, qam, 0 Refill(s), Type: Maintenance  nitroglycerin 0.4 mg sublingual tablet: 0 Refill(s), Type: Soft Stop  ranitidine 150 mg oral tablet: See Instructions, Instructions: 0.5 tab, 0 Refill(s), Type: Maintenance  simvastatin 10 mg oral tablet: 1 tab(s) ( 10 mg ), PO, Once a day (at bedtime), # 30 tab(s), 0 Refill(s), Type: Maintenance  triamcinolone 0.1% topical cream: 1 karl, top, bid, 0 Refill(s), Type: Maintenance,    Medications          *denotes recorded medication          Lidocaine/Benadryl/Maalox 1:1:1: See Instructions, 5-10cc swish and spit TID prn for mouth pain, 200 mL, 0 Refill(s).          *amLODIPine 2.5 mg oral tablet: 0 Refill(s).          *aspirin 81 mg oral tablet: 81 mg, 1 tab(s), PO, Daily, 0 Refill(s).          Tessalon Perles 100 mg oral capsule: 100 mg, 1 cap(s), Oral, tid, for 10 day(s), PRN: as needed for cough, 30 cap(s), 1 Refill(s).          Diprolene 0.05% topical ointment: 1 karl, TOP, BID, 50 g, 0 Refill(s).          *Tums: See Instructions, 250 mg chewed QOD, 0 Refill(s).          *Vitamin D3 1000 intl units oral tablet: 1,000 International Unit, 1 tab(s), po, bid, 0 Refill(s).          *clobetasol 0.05% topical ointment: 1 karl, top, bid, 0 Refill(s).          *finasteride 5 mg oral tablet: 1 tab(s), PO, Daily, 30 tab(s).          *hydrocortisone 2.5% topical cream: 1 karl, TOP, bid, 20 g, 0 Refill(s).          Atrovent 42 mcg/inh nasal spray: 2 spray(s), Nasal, qid, for 10 day(s), PRN: as needed for cold symptoms, 1 EA, 1 Refill(s).          *isosorbide mononitrate 30 mg oral tablet, extended release: 60 mg, 2 tab(s), po, qam, 0 Refill(s).          Metoprolol Succinate ER 50 mg oral tablet, extended release: See Instructions, TAKE ONE TABLET BY MOUTH EVERY DAY - ALONG WITH A 25MG  TABLET, 90 unknown unit, 1 Refill(s).          *Metoprolol Succinate ER 25 mg oral tablet, extended release: 25 mg, 1 tab(s), Oral, daily, 0 Refill(s).          *nitroglycerin 0.4 mg sublingual tablet: 0 Refill(s).          *Omega-3 oral capsule: 0 Refill(s).          *ranitidine 150 mg oral tablet: See Instructions, 0.5 tab, 0 Refill(s).          *simvastatin 10 mg oral tablet: 10 mg, 1 tab(s), PO, Once a day (at bedtime), 30 tab(s), 0 Refill(s).          *Flomax 0.4 mg oral capsule: 1 cap(s), PO, Daily, 30 cap(s).          *triamcinolone 0.1% topical cream: 1 karl, top, bid, 0 Refill(s).       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  Amaurosis fugax of left eye (362.34): Onset on 2002 at 74 years.  Osteopenia (733.90): Onset in  at 73 years.  PMR (Polymyalgia Rheumatica) (725): Onset in  at 69 years.  BPH (Benign Prostatic Hypertrophy) (600.00)  AAA (Abdominal Aortic Aneurysm) (441.4)   Family History:    CA - Lung cancer  Father ()  Bladder cancer  Mother ()     Procedure history:    Cholecystectomy (78027432) on 2007 at 78 Years.  Upper GI endoscopy (2476357714) in  at 67 Years.   Social History:        Tobacco Assessment: Denies Tobacco Use            Past                     Comments:                      2011 - Greta James                     Smoked for a brief period of time. Quit back in the 1950s.      Employment and Education Assessment             Retired      Home and Environment Assessment            Marital status: .        Physical Examination   vital signs stable, as noted above   Vital Signs   9/4/2019 9:44 AM CDT Temperature Tympanic 97.7 DegF  LOW    Peripheral Pulse Rate 69 bpm    Systolic Blood Pressure 100 mmHg    Diastolic Blood Pressure 58 mmHg  LOW    Mean Arterial Pressure 72 mmHg    Oxygen Saturation 98 %      Measurements from flowsheet : Measurements   9/4/2019 9:44 AM CDT     Weight Measured - Standard                172.8 lb     General:  Alert and oriented, No acute distress.    HENT:  Normocephalic, Tympanic membranes are clear, Oral mucosa is moist, No pharyngeal erythema, No sinus tenderness.    Neck:  Supple, Non-tender.    Respiratory:  Lungs are clear to auscultation, Respirations are non-labored.    Cardiovascular:  Normal rate, Regular rhythm.    Gastrointestinal:  Soft, Non-tender.    Neurologic:  Alert, Oriented.    Cognition and Speech:  Oriented, Speech clear and coherent.    Psychiatric:  Appropriate mood & affect.       Review / Management   Results review:  Lab results   9/4/2019 10:12 AM CDT WBC 5.3    RBC 4.05    Hgb 12.5 g/dL    Hct 36.3 %    MCV 90 fL    MCH 30.9 pg    MCHC 34.4 g/dL    RDW 13.5 %    Platelet 142    MPV 9.8 fL   .       Impression and Plan   Diagnosis     AAA (Abdominal Aortic Aneurysm) (GTZ00-NV I71.4).     Coronary artery disease (YAB43-PE I25.10).     Hypertension (GFS04-OP I10).     PMR (Polymyalgia Rheumatica) (KUH04-AO M35.3).         .) URI x 1 week; no pneumonia features (normal Spo2, afebrile)  - CXR: appearing clear  - CBC: normal WBC  - general reassurance this is not pneumonia, more than likely viral URI   - offered atrovent nasal spray, mucinex, and tessalon perles for symptom relief   - counseled on s/s of superimposed bacterial pneumonia and advised to rtc if symptoms are noticeably worse    plan as previously outlined:     .) Hypertension - recent ambulatory 24 hr bp monitor  (8/2016); mean SBP in 130s  current antihypertensive regimen: Toprol XL 75mg (PM), ISMN 30mg BID, amlodipine 2.5mg qPM  regimen changes: increase amlodipine to 5mg   intolerance:  future titration/work-up plan:    -    .) CAD, suspected based on abnormal nuclear stress testing in '12 ([place]); no h/o angiography   - conservative mgmt via medical therapy given age            - maintained on dual anti-platelet therapy    .) infrarenal AAA   - last U/S measurement of 4.8cm - stable         Professional Services   Counseling Summary:  This was a 15 minute visit with greater than 50% of that time spent counseling the patient.

## 2022-02-16 NOTE — PROGRESS NOTES
Patient:   ERICA MTZ SR            MRN: [id number]            FIN: [id number]               Age:   88 years     Sex:  Male     :  9/15/1928   Associated Diagnoses:   AAA (Abdominal Aortic Aneurysm); Coronary artery disease; Hypertension; PMR (Polymyalgia Rheumatica)   Author:   Leonardo Gilbert MD      Visit Information      Date of Service: 2017 05:08 pm  Performing Location: [facility]  Encounter#: [id number]      Primary Care Provider (PCP):  Leonardo Gilbert MD# [id number]      Referring Provider:  Leonardo Gilbert MD# [id number]      Chief Complaint   2017 5:12 PM CDT     f/u PMR--Prednisone has helped some but not completely.              Additional Information:No additional information recorded during visit.   Chief complaint and symptoms as noted above and confirmed with patient.  Recent lab and diagnostic studies reviewed with patient      History of Present Illness   2016: Erica presents to clinic with several concerns.  He is primarily followed through the general medicine clinic at [place] by Dr. Krishna Man.  He has a prior history of suspected coronary artery disease related to a positive nuclear stress test in .  He has been treated with medical management and has never undergone diagnostic heart catheterization.  He also has a suspected history of cerebrovascular disease and is maintained on both aspirin and Plavix.  Overall he is a very intelligent man who has good insight into his underlying medical care.  His primary concern is his blood pressure lability.  He provides meticulous detail of blood pressure readings at home.  His systolic pressures in the morning and the evenings are consistently in the 150s-160s range.  He shows morning blood pressures after breakfast (between 8 and 11 AM) with systolic readings between 90s-110s.  He describes having orthostatic symptoms on a fairly predictable basis during this timeframe.  Currently he takes isosorbide  mononitrate 30 mg in the morning and Toprol-XL 25 mg in the evening.  He also describes having a chest tightness when attempting to shovel snow on his driveway.  He does not feel this is angina.  He feels that symptoms are primarily brought on by the cold weather.  Denies any active chest pains at this time.  He does have nitroglycerin tablets available at home though has never felt he needed them.    11/9/2016: Returns to clinic for general follow-up.  He was seen by cardiology clinic at [place] in August of this year.  He has had his home blood pressure medication slowly advanced.  Currently taking Imdur 30 mg twice daily, Toprol-XL 75 mg in the evening and amlodipine 2.5 mg prior to bedtime.  He did complete a 24-hour amatory blood pressure monitor in August showing average systolic pressures less than 140.  Home readings typically in the high 140s.  No further presyncopal episodes.    5/25/2017: Presents to clinic with approximately 5 day history of left-sided gluteal sciatic pains extending down left leg.  Primary concern is for the past few weeks describes substantial proximal myalgias in both shoulders, through his torso and hip girdle.  He says symptoms are very similar to what he experienced when originally diagnosed with polymyalgia rheumatica approximately 10 years ago.  He recalls having very exquisite responsiveness to steroids at the time.  He recalls a fairly quick tapering off of prednisone, and was never on a maintenance phase of corticosteroids.  He did have bilateral temporal artery biopsies back in 2008 which were normal.  Denies any current headache or visual changes.    6/1/2017: Erica returns to clinic for one-week follow-up.  Laboratory testing from last week demonstrated normal sedimentation rate and a isolated elevation of his C-reactive protein.  He describes this as the same pattern that was discovered approximately 8 years ago through [facility].  He was started on prednisone, initially  at 40 mg daily for 3 days followed by a dose reduction 20 mg daily.  He states that the first day he took prednisone his symptoms fully evaporated and felt the best he has in several weeks.  Thereafter he began developing an urticarial rash across his arms, thighs, torso as well as a return of his previous fatigue base complaints.  No longer describing any myalgias.  Also describing a generalized headache.  He in general describes the last week as a disaster.          Review of Systems   Constitutional:  Weakness, Fatigue, No fever, No chills.    Eye:  Negative except as documented in history of present illness.    Ear/Nose/Mouth/Throat:  Negative except as documented in history of present illness.    Respiratory:  No shortness of breath.    Cardiovascular:  Chest pain, No palpitations, No peripheral edema, No syncope.    Gastrointestinal:  No nausea, No vomiting, No abdominal pain.    Genitourinary:  No dysuria, No hematuria.    Hematology/Lymphatics:  Negative except as documented in history of present illness.    Endocrine:  No excessive thirst, No polyuria.    Immunologic:  No recurrent fevers.    Musculoskeletal:  Joint pain, Muscle pain, Decreased range of motion.         Back pain: On the left side.    Integumentary:  Rash, Pruritus.    Neurologic:  Alert and oriented X4, Headache, No numbness, No tingling.       Health Status   Allergies:    Allergic Reactions (Selected)  Severity Not Documented  Aspirin (No reactions were documented)   Medications:  (Selected)   Prescriptions  Prescribed  Metoprolol Succinate ER 50 mg oral tablet, extended release: See Instructions, Instructions: TAKE ONE TABLET BY MOUTH EVERY DAY - ALONG WITH A 25MG TABLET, # 90 tab(s), 0 Refill(s), Type: Maintenance, Pharmacy: [facility], TAKE ONE TABLET BY MOUTH EVERY DAY - ALONG WITH A 25MG TABLET  ZyrTEC 10 mg oral tablet: 1 tab(s) ( 10 mg ), PO, Daily, # 30 tab(s), 3 Refill(s), Type: Maintenance, Pharmacy:  [facility], 1 tab(s) po daily  amLODIPine 5 mg oral tablet: 1 tab(s) ( 5 mg ), po, daily, # 90 tab(s), 0 Refill(s), Type: Soft Stop, Pharmacy: [facility], 1 tab(s) po daily  methylPREDNISolone 32 mg oral tablet: 1 tab(s) ( 32 mg ), PO, Daily, # 14 tab(s), 0 Refill(s), Type: Maintenance, Pharmacy: [facility], 1 tab(s) po daily,x14 day(s)  Documented Medications  Documented  Flomax 0.4 mg oral capsule: 1 cap(s), PO, Daily, # 30 cap(s), 0  Nitrostat 0.4 mg sublingual tablet: 1 tab(s) ( 0.4 mg ), SL, prn, PRN: for chest pain, Type: Maintenance  Omega-3 oral capsule: 0 Refill(s), Type: Maintenance  Plavix 75 mg oral tablet: 1 tab(s), PO, Daily, # 30 tab(s), 0  Tums: See Instructions, Instructions: 250 mg chewed QOD, 0 Refill(s), Type: Maintenance  Vitamin D3 1000 intl units oral tablet: 1 tab(s) ( 1,000 International Unit ), po, bid, 0 Refill(s), Type: Maintenance  aspirin 81 mg oral tablet: 1 tab(s) ( 81 mg ), PO, Daily, 0 Refill(s), Type: Maintenance  clobetasol 0.05% topical ointment: 1 karl, top, bid, 0 Refill(s), Type: Maintenance  finasteride 5 mg oral tablet: 1 tab(s), PO, Daily, # 30 tab(s), 0 Refill(s)  hydrocortisone 2.5% topical cream: 1 karl, TOP, bid, # 20 g, 0 Refill(s), Type: Maintenance  isosorbide mononitrate 30 mg oral tablet, extended release: 2 tab(s) ( 60 mg ), po, qam, 0 Refill(s), Type: Maintenance  metoprolol succinate 25 mg oral tablet, extended release: 1 tab(s) ( 25 mg ), po, daily, 0 Refill(s), Type: Maintenance  ranitidine 150 mg oral tablet: 1 tab(s) ( 150 mg ), po, daily, 0 Refill(s), Type: Maintenance  simvastatin 10 mg oral tablet: 1 tab(s) ( 10 mg ), PO, Once a day (at bedtime), # 30 tab(s), 0 Refill(s), Type: Maintenance  triamcinolone 0.1% topical cream: 1 karl, top, bid, 0 Refill(s), Type: Maintenance   Problem list:    All Problems  AAA (Abdominal Aortic Aneurysm) / ICD-9-.4 / Confirmed  Amaurosis fugax of  left eye / ICD-9-.34 / Confirmed  BPH (Benign Prostatic Hypertrophy) / ICD-9-.00 / Confirmed  Osteopenia / ICD-9-.90 / Confirmed  Peptic Disease / ICD-9-.90 / Confirmed  PMR (Polymyalgia Rheumatica) / ICD-9- / Confirmed  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  Amaurosis fugax of left eye (362.34): Onset on 2002 at 74 years.  Osteopenia (733.90): Onset in  at 73 years.  PMR (Polymyalgia Rheumatica) (725): Onset in  at 69 years.  BPH (Benign Prostatic Hypertrophy) (600.00)  AAA (Abdominal Aortic Aneurysm) (441.4)   Family History:    CA - Lung cancer  Father ()  Bladder cancer  Mother ()     Procedure history:    Cholecystectomy (66746507) on 2007 at 78 Years.  Upper GI endoscopy (5116590631) in  at 67 Years.   Social History:        Tobacco Assessment: Denies Tobacco Use            Past                     Comments:                      2011 - Greta James                     Smoked for a brief period of time. Quit back in the 1950s.      Employment and Education Assessment            Retired      Home and Environment Assessment            Marital status: .        Physical Examination   vital signs stable, as noted above   Vital Signs   2017 5:12 PM CDT Temperature Tympanic 99 DegF    Peripheral Pulse Rate 76 bpm    Pulse Site Radial artery    HR Method Manual    Systolic Blood Pressure 140 mmHg    Diastolic Blood Pressure 60 mmHg    Mean Arterial Pressure 87 mmHg    BP Site Right arm    BP Method Manual      Measurements from flowsheet : Measurements   2017 5:12 PM CDT Height Measured - Standard 68.25 in    Weight Measured - Standard 176 lb    BSA 1.96 m2    Body Mass Index 26.56 kg/m2      General:  Alert and oriented, No acute distress.    Eye:  Pupils are equal,  round and reactive to light, Extraocular movements are intact, Normal conjunctiva.    HENT:  Normocephalic, Tympanic membranes are clear, Oral mucosa is moist, lips appear more reddened.  No mucositis.  Conjunctiva and oral mucosa appear normal.    Neck:  Supple, No carotid bruit, No jugular venous distention, No lymphadenopathy.    Respiratory:  Lungs are clear to auscultation, Respirations are non-labored.    Cardiovascular:  Normal rate, Regular rhythm, No murmur, No edema.    Gastrointestinal:  Soft.    Musculoskeletal:  Normal range of motion, Normal strength, No tenderness.    Integumentary:  diffuse urticarial rash across torso/back, arms, thighs.    Neurologic:  Alert, Oriented, Normal motor function, No focal deficits, Cranial Nerves II-XII are grossly intact.    Cognition and Speech:  Oriented, Speech clear and coherent.    Psychiatric:  Appropriate mood & affect.       Review / Management   Results review:  Lab results   5/25/2017 4:12 PM CDT C-Reactive Protein (CRP) 2.52 mg/dL  HI    Sed Rate 17   .       Impression and Plan   Diagnosis     AAA (Abdominal Aortic Aneurysm) (YOK77-HL I71.4).     Coronary artery disease (KXH84-SB I25.10).     Hypertension (VIN58-FW I10).     PMR (Polymyalgia Rheumatica) (NXE48-FB M35.3).         .) myalgias, urticarial rash, headaches - all very concerning development of symptoms despite use of prednisone for the past 1 week.  I'm less convinced about PMR given progression of symptoms and no sustained improvement on moderate dose corticosteroids.  Concerns for vasculitis vs. drug reaction   - prednisone drug reaction? - seems quite unusual   - still concerned enough about vasculitis, will change to methylprednisone 32mg daily for 2 week until seen again   - advised using cetirizine 10mg daily   - (5/25/2017): CRP 2.52, sed rate 17   - will repeat CRP, sed rate.  CHeck CBC with peripheral smear (assess for eosinophils), BMP, LFTs, C3, C4, HCRISTIN, ANCA, cryoglobulins, LDH,  urinalysis, urine protein/Cr ratio   - referral to [facility].  May need to contact his [facility] PCP to help facilitate scheduling   - may need to consider skin biopsy: potential leukocytoclastic vasculitis?    plan as previously outlined:     .) Hypertension - recent ambulatory 24 hr bp monitor (8/2016); mean SBP in 130s  current antihypertensive regimen: Toprol XL 75mg (PM), ISMN 30mg BID, amlodipine 2.5mg qPM  regimen changes: increase amlodipine to 5mg   intolerance:  future titration/work-up plan:    -    .) CAD, suspected based on abnormal nuclear stress testing in '12 ([facility]); no h/o angiography   - conservative mgmt via medical therapy given age            - maintained on dual anti-platelet therapy    .) infrarenal AAA   - last U/S measurement of 4.8cm - stable    RTC in 2 weeks for reassessment of symptoms      Professional Services   Counseling Summary:  This was a 25 minute visit with greater than 50% of that time spent counseling the patient.

## 2022-02-16 NOTE — NURSING NOTE
Comprehensive Intake Entered On:  9/22/2021 11:09 AM CDT    Performed On:  9/22/2021 11:05 AM CDT by Ashia Hooker CMA               Summary   Chief Complaint :   f/u ongoing right leg pain - now having swelling.  Trialed 2 sessions of PT   Advance Directive :   Yes   Weight Measured :   177.7 lb(Converted to: 177 lb 11 oz, 80.603 kg)    Systolic Blood Pressure :   146 mmHg (HI)    Diastolic Blood Pressure :   73 mmHg   Mean Arterial Pressure :   97 mmHg   Peripheral Pulse Rate :   61 bpm   Temperature Tympanic :   97.5 DegF(Converted to: 36.4 DegC)  (LOW)    Oxygen Saturation :   100 %   Ashia Hooker CMA - 9/22/2021 11:05 AM CDT   Health Status   Allergies Verified? :   Yes   Medication History Verified? :   Yes   Medical History Verified? :   Yes   Pre-Visit Planning Status :   Completed   Tobacco Use? :   Former smoker   Ashia Hooker CMA 9/22/2021 11:05 AM CDT   Consents   Consent for Immunization Exchange :   Consent Granted   Consent for Immunizations to Providers :   Consent Granted   Ashia Hooker CMA 9/22/2021 11:38 AM CDT   Celso Fall Risk   History of Falls in Last 3 Months Cel[place] :   No   Ashia Hooker CMA 9/22/2021 11:05 AM CDT   Social History   Social History   (As Of: 9/22/2021 11:09:41 AM CDT)   Tobacco:  Denies Tobacco Use      Past   (Last Updated: 5/3/2011 3:01:20 PM CDT by Natacha Tuttle MA)    Comments:  5/6/2011 12:55 PM - Greta James: Smoked for a brief period of time. Quit back in the 1950s.   (Last Updated: 5/6/2011 12:55:25 PM CDT by Greta James)   Former smoker, quit more than 30 days ago   (Last Updated: 9/8/2021 10:49:13 AM CDT by Xochitl Aguayo)          Electronic Cigarette/Vaping:        Electronic Cigarette Use: Never.   (Last Updated: 9/8/2021 10:49:23 AM CDT by Xochitl Aguayo)          Employment/School:        Retired   (Last Updated: 5/6/2011 1:15:51 PM CDT by Greta James)          Home/Environment:        Marital status: .   (Last Updated:  5/6/2011 1:23:50 PM CDT by Greta James)            OB/GYN History   Pregnancy History   (As Of: 9/22/2021 11:09:41 AM CDT)   No pregnancy history documented

## 2022-02-16 NOTE — NURSING NOTE
Comprehensive Intake Entered On:  9/4/2019 9:47 AM CDT    Performed On:  9/4/2019 9:44 AM CDT by Ashia Hooker CMA               Summary   Chief Complaint :   Not feeling well x 1wk  c/o chest and nasal congestion, nausea/stomach pains, diarrhea starting, achy,weak, cough-productive at time with near syncope with cough, sweats    Advance Directive :   Yes   Weight Measured :   172.8 lb(Converted to: 172 lb 13 oz, 78.38 kg)    Systolic Blood Pressure :   100 mmHg   Diastolic Blood Pressure :   58 mmHg (LOW)    Mean Arterial Pressure :   72 mmHg   Peripheral Pulse Rate :   69 bpm   Temperature Tympanic :   97.7 DegF(Converted to: 36.5 DegC)  (LOW)    Oxygen Saturation :   98 %   Ashia Hooker CMA - 9/4/2019 9:44 AM CDT   Health Status   Allergies Verified? :   Yes   Medication History Verified? :   Yes   Medical History Verified? :   Yes   Pre-Visit Planning Status :   Completed   Tobacco Use? :   Former smoker   Ashia Hooker CMA - 9/4/2019 9:44 AM CDT   Social History   Social History   (As Of: 9/4/2019 9:47:10 AM CDT)   Tobacco:  Denies Tobacco Use      Past   (Last Updated: 5/3/2011 3:01:20 PM CDT by Natacha Tuttle MA)    Comments:  5/6/2011 12:55 PM - Greta James: Smoked for a brief period of time. Quit back in the 1950s.   (Last Updated: 5/6/2011 12:55:25 PM CDT by Greta James)          Employment/School:        Retired   (Last Updated: 5/6/2011 1:15:51 PM CDT by Greta James)          Home/Environment:        Marital status: .   (Last Updated: 5/6/2011 1:23:50 PM CDT by Greta James)

## 2022-02-16 NOTE — PROGRESS NOTES
Patient:   ERICA MTZ SR            MRN: [id number]            FIN: [id number]               Age:   89 years     Sex:  Male     :  9/15/1928   Associated Diagnoses:   None   Author:   Leonardo Gilbert MD      Basic Information      Date of Service: 2021 10:52 am  Performing Location: [facility]  Encounter#: [id number]      Primary Care Provider (PCP):  Leonardo Gilbert MD    NPI# [id number]      Referring Provider:  Leonardo Gilbert MD    NPI# [id number]      Chief Complaint   2021 11:05 AM CDT   f/u ongoing right leg pain - now having swelling.  Trialed 2 sessions of PT      Subjective     21: Erica presents to clinic after emergency room visit in [place] this past week due to acute right calf pain now for over 1 week.  Does not describe any particular injury or trauma.  Pain primarily localized in the calf muscles.  ER work-up reviewed demonstrating a normal venous duplex study and x-ray of tibia-fibula without any evidence of fracture.  He was advised to follow-up for suspected musculoskeletal strain.  He was provided calf stretching exercises which he states only aggravates the pain.  He has been advised not to use any NSAIDs related to his chronic health conditions and antiplatelet therapy.    21: Erica presents today with new swelling of his calf and persistent pain. He noticed the swelling a couple days ago and it has not changed since then. Still experiences pain with walking, relief with rest. He notes the pain is worst when going from rest to walking and may improve slightly as he continues to walk. He rates the pain 5/10 at its worst. Compared to when he presented to the ED, he feels the pain is better but is concerned about the new swelling and that now he has pain along the bone anteriorly, as well. He tried 2 sessions of PT without relief. Ice does not seem to help. He does not take Tylenol for the pain. No radiation of he pain, no numbness or tingling in his foot.  Denies fever, chills, shortness of breath, or chest pain.      Review of Systems   Constitutional:  No fever, No chills, No weakness.    Eye:  Negative.    Ear/Nose/Mouth/Throat:       Decreased hearing: Bilaterally.    Respiratory:  No shortness of breath, No cough.    Cardiovascular:  No chest pain, No palpitations, No peripheral edema.    Gastrointestinal:  Negative.    Genitourinary:  Negative.    Hematology/Lymphatics:  Negative.    Endocrine:  Negative.    Immunologic:  Negative.    Musculoskeletal:  Muscle pain, Pain in his right calf.    Integumentary:  Negative.    Neurologic:  Alert and oriented X4, No numbness, No tingling.    Psychiatric:  Negative.       Health Status   Allergies:    Nonallergic Reactions (All)  Severity Not Documented  Augmentin (Diarrhea)  Canceled/Inactive Reactions (All)  Severity Not Documented  Aspirin (No reactions were documented)  No Known Medication Allergies  NSAIDs (No reactions were documented)   Medications:  (Selected)   Prescriptions  Prescribed  Diprolene 0.05% topical ointment: 1 karl, TOP, BID, # 50 g, 0 Refill(s), Type: Maintenance, Pharmacy: [facility], 1 karl top bid  Lidocaine/Benadryl/Maalox 1:1:1: Lidocaine/Benadryl/Maalox 1:1:1, See Instructions, Instructions: 5-10cc swish and spit TID prn for mouth pain, Supply, # 200 mL, 0 Refill(s), Type: Maintenance, 5-10cc swish and spit TID prn for mouth pain  Metoprolol Succinate ER 50 mg oral tablet, extended release: See Instructions, Instructions: TAKE ONE TABLET BY MOUTH EVERY DAY - ALONG WITH A 25MG TABLET, # 90 unknown unit, 1 Refill(s), Type: Maintenance, Pharmacy: [facility]  predniSONE 20 mg oral tablet: = 1 tab(s) ( 20 mg ), Oral, daily, x 7 day(s), # 7 tab(s), 0 Refill(s), Type: Acute, Pharmacy: [facility], 1 tab(s) Oral daily,x7 day(s), 177.7, lb, 09/22/21 11:05:00 CDT, Weight Measured  Documented Medications  Documented  Flomax 0.4 mg  oral capsule: 1 cap(s), PO, Daily, # 30 cap(s), 0  Metoprolol Succinate ER 25 mg oral tablet, extended release: = 1 tab(s) ( 25 mg ), Oral, daily, 0 Refill(s), Type: Soft Stop  Omega-3 oral capsule: 0 Refill(s), Type: Maintenance  Tums: See Instructions, Instructions: 250 mg chewed QOD, 0 Refill(s), Type: Maintenance  Vitamin D3 1000 intl units oral tablet: 1 tab(s) ( 1,000 International Unit ), po, bid, 0 Refill(s), Type: Maintenance  amLODIPine 2.5 mg oral tablet: 0 Refill(s), Type: Soft Stop  aspirin 81 mg oral tablet: 1 tab(s) ( 81 mg ), PO, Daily, 0 Refill(s), Type: Maintenance  clobetasol 0.05% topical ointment: 1 karl, top, bid, 0 Refill(s), Type: Maintenance  finasteride 5 mg oral tablet: 1 tab(s), PO, Daily, # 30 tab(s), 0 Refill(s)  hydrocortisone 2.5% topical cream: 1 karl, TOP, bid, # 20 g, 0 Refill(s), Type: Maintenance  isosorbide mononitrate 30 mg oral tablet, extended release: 2 tab(s) ( 60 mg ), po, qam, 0 Refill(s), Type: Maintenance  nitroglycerin 0.4 mg sublingual tablet: 0 Refill(s), Type: Soft Stop  simvastatin 10 mg oral tablet: 1 tab(s) ( 10 mg ), PO, Once a day (at bedtime), # 30 tab(s), 0 Refill(s), Type: Maintenance  triamcinolone 0.1% topical cream: 1 karl, top, bid, 0 Refill(s), Type: Maintenance,    Medications          *denotes recorded medication          Lidocaine/Benadryl/Maalox 1:1:1: See Instructions, 5-10cc swish and spit TID prn for mouth pain, 200 mL, 0 Refill(s).          *amLODIPine 2.5 mg oral tablet: 0 Refill(s).          *aspirin 81 mg oral tablet: 81 mg, 1 tab(s), PO, Daily, 0 Refill(s).          Diprolene 0.05% topical ointment: 1 kalr, TOP, BID, 50 g, 0 Refill(s).          *Tums: See Instructions, 250 mg chewed QOD, 0 Refill(s).          *Vitamin D3 1000 intl units oral tablet: 1,000 International Unit, 1 tab(s), po, bid, 0 Refill(s).          *clobetasol 0.05% topical ointment: 1 karl, top, bid, 0 Refill(s).          *finasteride 5 mg oral tablet: 1 tab(s), PO, Daily, 30  tab(s).          *hydrocortisone 2.5% topical cream: 1 karl, TOP, bid, 20 g, 0 Refill(s).          *isosorbide mononitrate 30 mg oral tablet, extended release: 60 mg, 2 tab(s), po, qam, 0 Refill(s).          Metoprolol Succinate ER 50 mg oral tablet, extended release: See Instructions, TAKE ONE TABLET BY MOUTH EVERY DAY - ALONG WITH A 25MG TABLET, 90 unknown unit, 1 Refill(s).          *Metoprolol Succinate ER 25 mg oral tablet, extended release: 25 mg, 1 tab(s), Oral, daily, 0 Refill(s).          *nitroglycerin 0.4 mg sublingual tablet: 0 Refill(s).          *Omega-3 oral capsule: 0 Refill(s).          predniSONE 20 mg oral tablet: 20 mg, 1 tab(s), Oral, daily, for 7 day(s), 7 tab(s), 0 Refill(s).          *simvastatin 10 mg oral tablet: 10 mg, 1 tab(s), PO, Once a day (at bedtime), 30 tab(s), 0 Refill(s).          *Flomax 0.4 mg oral capsule: 1 cap(s), PO, Daily, 30 cap(s).          *triamcinolone 0.1% topical cream: 1 karl, top, bid, 0 Refill(s).       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  Varicose Veins of Leg / ICD-9-.9 / Confirmed      Objective   Vital Signs   9/22/2021 11:09 AM CDT Systolic Blood Pressure 144 mmHg  HI    Diastolic Blood Pressure 70 mmHg    Mean Arterial Pressure 95 mmHg   9/22/2021 11:05 AM CDT Temperature Tympanic 97.5 DegF  LOW    Peripheral Pulse Rate 61 bpm    Systolic Blood Pressure 146 mmHg  HI    Diastolic Blood Pressure 73 mmHg    Mean Arterial Pressure 97 mmHg    Oxygen Saturation 100 %      Measurements from flowsheet : Measurements   9/22/2021 11:05 AM CDT   Weight Measured - Standard                177.7 lb      General:  Alert and oriented, No acute distress.    Eye:  Pupils are equal, round and reactive to light, Extraocular movements are intact.    HENT:  Normocephalic, Hearing loss.    Neck:  Supple, Non-tender.    Respiratory:  Lungs are clear to auscultation, Respirations are non-labored, Symmetrical chest wall expansion.    Cardiovascular:  Normal rate, Regular rhythm, No murmur, No gallop, Good pulses equal in all extremities, No edema.    Gastrointestinal:  Soft, Non-distended.    Musculoskeletal:  Normal range of motion, Normal strength, Mild swelling of right calf and tenderness to palpation. Alis's sign negative. No erythema or warmth. Dorsalis pedis and posterior tibialis pulses 2+. Sensation intact. Bruise with different stages of healing over anterior lower shin..    Integumentary:  Warm, Dry.    Neurologic:  Alert, Oriented, Normal sensory, No focal deficits, Cranial Nerves II-XII are grossly intact.    Psychiatric:  Cooperative, Appropriate mood & affect.       Impression and Plan   Diagnosis     Strain of right calf muscle (RSO86-AI S86.811A).     - [facility] ED results reviewed with patient   - venous duplex without DVT   - reportedly tib-fib xrays without fracture   - Achilles tendon intact based on [place] test - no Achilles tendinitis   - suspect acute calf muscle strain     - reassurance given, needing more rest; no need for Cam boot   - may try Voltaren gel for pain and inflammation   - if Voltaren gel does not provide relief, prednisone 20mg daily for 7 days to help with inflammation   - consensus to hold off on further imaging - not likely to change current mgmt      I was physically present and examined the patient in the presence of PA student.  All aspects of documented assessment and plan have been reviewed by me and corrected accordingly.

## 2022-02-16 NOTE — PROGRESS NOTES
"   Patient:   LILIANA MTZ SR            MRN: [id number]            FIN: [id number]               Age:   89 years     Sex:  Male     :  9/15/1928   Associated Diagnoses:   None   Author:   Letitia Schultz      History of Present Illness   PPC for right groin pain of sudden onset.  pain was not present at night, got out of bed and pain started on right thigh, anterior/medial aspect  no difficulty urinating or stooling, no injury to hip or back.  no rash, no fever  hx of polymyalgia rheumatica but this is not the typical symptoms onset for a flare of this.    normally sees IM at [facility]  \"I had this happen last year and came into the emergency room and Dr Raymundo gave something that did the trick then he sent me home on pain medication\"    pt tells me he has been hospitalized at [facility] for similar symptoms but \"even they can't figure out what is wrong with me nerves.  they think it may be a virus that causes them to inflame.  But if I don't get the pain controlled I will need morphine IV\"    see PMH      Review of Systems   Constitutional:  Negative.    Respiratory:  Negative.    Cardiovascular:  Negative.    Gastrointestinal:  Negative.    Genitourinary:  Negative.    Immunologic:  Negative.    Musculoskeletal:  Negative except as documented in history of present illness.    Integumentary:  Negative.    Neurologic:  Negative except as documented in history of present illness.    Psychiatric:  Negative.              Health Status   Allergies:    Nonallergic Reactions (Selected)  Severity Not Documented  Augmentin (Diarrhea)   Medications:  (Selected)   Prescriptions  Prescribed  Augmentin 875 mg oral tablet: 1 tab(s), PO, q12hr, # 14 tab(s), 0 Refill(s), Type: Maintenance, Pharmacy: [facility], 1 tab(s) po q12 hrs,x7 day(s)  Claritin-D 12 Hour oral tablet, extended release: 1 tab(s), PO, q12hr, # 14 tab(s), 0 Refill(s), Type: Maintenance  Diprolene 0.05% topical ointment: 1 karl, TOP, BID, " # 50 g, 0 Refill(s), Type: Maintenance, Pharmacy: [facility], 1 karl top bid  Lidocaine/Benadryl/Maalox 1:1:1: Lidocaine/Benadryl/Maalox 1:1:1, See Instructions, Instructions: 5-10cc swish and spit TID prn for mouth pain, Supply, # 200 mL, 0 Refill(s), Type: Maintenance, 5-10cc swish and spit TID prn for mouth pain  Metoprolol Succinate ER 50 mg oral tablet, extended release: See Instructions, Instructions: TAKE ONE TABLET BY MOUTH EVERY DAY - ALONG WITH A 25MG TABLET, # 90 unknown unit, 1 Refill(s), Type: Maintenance, Pharmacy: [facility]  Omnicef 300 mg oral capsule: 1 cap(s) ( 300 mg ), PO, q12hr, # 20 cap(s), 0 Refill(s), Type: Maintenance, Pharmacy: [facility], 1 cap(s) po q12 hrs,x10 day(s)  Robitussin-AC 10 mg-100 mg/5 mL oral syrup: 10 mL, PO, q4hr, PRN: for cough, # 240 mL, 0 Refill(s), Type: Maintenance, Pharmacy: [facility], 10 mL po q4 hrs,PRN:for cough  ZyrTEC 10 mg oral tablet: 1 tab(s) ( 10 mg ), PO, Daily, # 30 tab(s), 3 Refill(s), Type: Maintenance, Pharmacy: [facility], 1 tab(s) po daily  amLODIPine 5 mg oral tablet: See Instructions, Instructions: TAKE ONE TABLET BY MOUTH EVERY DAY, # 90 unknown unit, Type: Soft Stop, Pharmacy: [facility]  Documented Medications  Documented  Flomax 0.4 mg oral capsule: 1 cap(s), PO, Daily, # 30 cap(s), 0  Nitrostat 0.4 mg sublingual tablet: 1 tab(s) ( 0.4 mg ), SL, prn, PRN: for chest pain, Type: Maintenance  Omega-3 oral capsule: 0 Refill(s), Type: Maintenance  Plavix 75 mg oral tablet: 1 tab(s), PO, Daily, # 30 tab(s), 0  Tums: See Instructions, Instructions: 250 mg chewed QOD, 0 Refill(s), Type: Maintenance  Vitamin D3 1000 intl units oral tablet: 1 tab(s) ( 1,000 International Unit ), po, bid, 0 Refill(s), Type: Maintenance  aspirin 81 mg oral tablet: 1 tab(s) ( 81 mg ), PO, Daily, 0 Refill(s), Type:  Maintenance  clobetasol 0.05% topical ointment: 1 karl, top, bid, 0 Refill(s), Type: Maintenance  finasteride 5 mg oral tablet: 1 tab(s), PO, Daily, # 30 tab(s), 0 Refill(s)  hydrocortisone 2.5% topical cream: 1 karl, TOP, bid, # 20 g, 0 Refill(s), Type: Maintenance  isosorbide mononitrate 30 mg oral tablet, extended release: 2 tab(s) ( 60 mg ), po, qam, 0 Refill(s), Type: Maintenance  predniSONE 10 mg oral tablet: See Instructions, Instructions: tapering dose, 0 Refill(s), Type: Maintenance  ranitidine 150 mg oral tablet: 1 tab(s) ( 150 mg ), po, daily, 0 Refill(s), Type: Maintenance  simvastatin 10 mg oral tablet: 1 tab(s) ( 10 mg ), PO, Once a day (at bedtime), # 30 tab(s), 0 Refill(s), Type: Maintenance  triamcinolone 0.1% topical cream: 1 karl, top, bid, 0 Refill(s), Type: Maintenance   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    Eye:  Pupils are equal, round and reactive to light.    HENT:  Normocephalic.    Neck:  Supple.    Respiratory:  Lungs are clear to auscultation, Respirations are non-labored.    Musculoskeletal:  pt able to ambualte without difficulty, can sit and stand without issue, pain is  localized to anterior-medial aspect of right mid thigh, 10cm inferior to inguinal fold.  no skin break, no rash, no redness  muscle tone normal for age.  when I apply minimal pressure pt is grimacing d/t pain, I cannot apply deeper pressure d/t pt response  no femoral adenopathy, no inguinal pain, no scrotal tenderness.    Integumentary:  Warm, Dry, Pink.    Neurologic:  Alert, Oriented, Normal motor function.    Psychiatric:  Cooperative, Appropriate mood & affect, Normal judgment.       Impression and Plan   Patient Instructions:       Counseled: Patient, Regarding diagnosis, Regarding medications, Verbalized understanding.    Summary:  i am unsure what to make of localized tenderness.  I have explained to him that I would like to see a sed rate,  possible CRP, CK, etc but pt has declined.   I cannot explain to him the cause of pain and although it may be polymyalgia rheumatica he claims it is not  when seen in [facility] ED by Dr Raymundo in 2017 this was for left hip pain with radiculopathy and was given a dilaudid injection and prednisone taper  I am not going to give him dilaudid IM today because he drove himself.    His kidney functin (checked 6/2017) is slightly diminished.  I have cautioned him that use of dilaudid can alter resp function and also increase risk of falls.  he is only to use this medication to help with pain until steroid can kick in which may take up to 12 hours.  I have given him #6 tabs of dilaudid.    prednisone taper  please follow up with Dr Gilbert or Nicolas.    Orders     Orders (Selected)   Prescriptions  Prescribed  Dilaudid 2 mg oral tablet: 1 tab(s) ( 2 mg ), po, q4 hrs, # 6 tab(s), 0 Refill(s), Type: Maintenance, Pharmacy: [facility], 1 tab(s) po q4 hrs,x1 day(s)  predniSONE 10 mg oral tablet: See Instructions, Instructions: 40mg x3d then [id number] then 76hby2x, # 21 tab(s), 0 Refill(s), Type: Maintenance, Pharmacy: [facility], 40mg x3d  then [id number] then [id number].

## 2022-02-16 NOTE — PROGRESS NOTES
Patient:   LILIANA MTZ SR            MRN: [id number]            FIN: [id number]               Age:   89 years     Sex:  Male     :  9/15/1928   Associated Diagnoses:   Acute recurrent maxillary sinusitis   Author:   Selma Wright MD      Chief Complaint   2017 11:09 AM CST  Cold/chills.        History of Present Illness   Patient here with sinus congestion and cough.  Started with cold/URI early in December.  Worsened mid December, prescribed Augmentin.  Had previously had problems with stomach upset from Augmentin but decided to try anyway.  Took for 3 days and sinuses/cough improved significantly. However, had severe abdominal pain and diarrhea.  Given improvement, was recommended to stop abx and monitor.  Over the past 4-5 days, increasing maxillary and frontal sinus pressure.  Hacking, wet cough, worst at night. Not sleeping.  Does not feel short of breath except when coughing becomes severe.  No known fevers.      Health Status   Allergies:    Nonallergic Reactions (All)  Severity Not Documented  Augmentin (Diarrhea)  Canceled/Inactive Reactions (All)  Severity Not Documented  Aspirin (No reactions were documented)  No Known Medication Allergies  NSAIDs (No reactions were documented)   Medications:  (Selected)   Prescriptions  Prescribed  Augmentin 875 mg oral tablet: 1 tab(s), PO, q12hr, # 14 tab(s), 0 Refill(s), Type: Maintenance, Pharmacy: [facility], 1 tab(s) po q12 hrs,x7 day(s)  Claritin-D 12 Hour oral tablet, extended release: 1 tab(s), PO, q12hr, # 14 tab(s), 0 Refill(s), Type: Maintenance  Diprolene 0.05% topical ointment: 1 karl, TOP, BID, # 50 g, 0 Refill(s), Type: Maintenance, Pharmacy: [facility], 1 karl top bid  Lidocaine/Benadryl/Maalox 1:1:1: Lidocaine/Benadryl/Maalox 1:1:1, See Instructions, Instructions: 5-10cc swish and spit TID prn for mouth pain, Supply, # 200 mL, 0 Refill(s), Type: Maintenance, 5-10cc swish and spit TID prn for  mouth pain  Metoprolol Succinate ER 50 mg oral tablet, extended release: See Instructions, Instructions: TAKE ONE TABLET BY MOUTH EVERY DAY - ALONG WITH A 25MG TABLET, # 90 unknown unit, 1 Refill(s), Type: Maintenance, Pharmacy: [facility]  ZyrTEC 10 mg oral tablet: 1 tab(s) ( 10 mg ), PO, Daily, # 30 tab(s), 3 Refill(s), Type: Maintenance, Pharmacy: [facility], 1 tab(s) po daily  amLODIPine 5 mg oral tablet: 1 tab(s) ( 5 mg ), po, daily, # 90 tab(s), 0 Refill(s), Type: Maintenance, Pharmacy: [facility]  Documented Medications  Documented  Flomax 0.4 mg oral capsule: 1 cap(s), PO, Daily, # 30 cap(s), 0  Nitrostat 0.4 mg sublingual tablet: 1 tab(s) ( 0.4 mg ), SL, prn, PRN: for chest pain, Type: Maintenance  Omega-3 oral capsule: 0 Refill(s), Type: Maintenance  Plavix 75 mg oral tablet: 1 tab(s), PO, Daily, # 30 tab(s), 0  Tums: See Instructions, Instructions: 250 mg chewed QOD, 0 Refill(s), Type: Maintenance  Vitamin D3 1000 intl units oral tablet: 1 tab(s) ( 1,000 International Unit ), po, bid, 0 Refill(s), Type: Maintenance  aspirin 81 mg oral tablet: 1 tab(s) ( 81 mg ), PO, Daily, 0 Refill(s), Type: Maintenance  clobetasol 0.05% topical ointment: 1 karl, top, bid, 0 Refill(s), Type: Maintenance  finasteride 5 mg oral tablet: 1 tab(s), PO, Daily, # 30 tab(s), 0 Refill(s)  hydrocortisone 2.5% topical cream: 1 karl, TOP, bid, # 20 g, 0 Refill(s), Type: Maintenance  isosorbide mononitrate 30 mg oral tablet, extended release: 2 tab(s) ( 60 mg ), po, qam, 0 Refill(s), Type: Maintenance  predniSONE 10 mg oral tablet: See Instructions, Instructions: tapering dose, 0 Refill(s), Type: Maintenance  ranitidine 150 mg oral tablet: 1 tab(s) ( 150 mg ), po, daily, 0 Refill(s), Type: Maintenance  simvastatin 10 mg oral tablet: 1 tab(s) ( 10 mg ), PO, Once a day (at bedtime), # 30 tab(s), 0 Refill(s), Type: Maintenance  triamcinolone 0.1% topical cream: 1  karl, top, bid, 0 Refill(s), Type: Maintenance   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  Amaurosis fugax of left eye (ICD-9-.34): Onset on 2002 at 74 years.  Osteopenia (ICD-9-.90): Onset in  at 73 years.  PMR (Polymyalgia Rheumatica) (ICD-9-): Onset in  at 69 years.  BPH (Benign Prostatic Hypertrophy) (ICD-9-.00)  AAA (Abdominal Aortic Aneurysm) (ICD-9-.4)   Family History:    CA - Lung cancer  Father ()  Bladder cancer  Mother ()     Procedure history:    Cholecystectomy (03688096) on 2007 at 78 Years.  Upper GI endoscopy (9916298761) in  at 67 Years.   Social History:        Tobacco Assessment: Denies Tobacco Use            Past                     Comments:                      2011 - Greta James                     Smoked for a brief period of time. Quit back in the 1950s.      Employment and Education Assessment            Retired      Home and Environment Assessment            Marital status: .        Physical Examination   Vital Signs   2017 11:09 AM CST Temperature Tympanic 98.7 DegF    Peripheral Pulse Rate 92 bpm    HR Method Electronic    Systolic Blood Pressure 127 mmHg    Diastolic Blood Pressure 63 mmHg    Mean Arterial Pressure 84 mmHg    BP Method Electronic      Measurements from flowsheet : Measurements   2017 11:09 AM CST  Weight Measured - Standard                174.2 lb      General:  Alert and oriented, No acute distress.    Eye:  Pupils are equal, round and reactive to light, Normal conjunctiva.    HENT:  Normocephalic, Tympanic membranes are clear, Oral mucosa is moist, No pharyngeal erythema.         Sinus: Bilateral, Maxillary sinus, Tenderness.    Neck:  Supple, Non-tender.    Respiratory:  Lungs are clear to auscultation.    Cardiovascular:  Normal rate, Regular rhythm.       Impression and Plan   Diagnosis     Acute recurrent maxillary sinusitis (GYY91-OO J01.01).     Course:  Worsening.    Orders     Orders (Selected)   Prescriptions  Prescribed  Omnicef 300 mg oral capsule: 1 cap(s) ( 300 mg ), PO, q12hr, # 20 cap(s), 0 Refill(s), Type: Maintenance, [facility]acy: [facility], 1 cap(s) po q12 hrs,x10 day(s)  Robitussin-AC 10 mg-100 mg/5 mL oral syrup: 10 mL, PO, q4hr, PRN: for cough, # 240 mL, 0 Refill(s), Type: Maintenance, Pharmacy: [facility], 10 mL po q4 hrs,PRN:for cough.

## 2022-02-16 NOTE — TELEPHONE ENCOUNTER
Entered by Ashia Hooker CMA on September 05, 2019 1:33:49 PM CDT  LM for a return call at 1333      ---------------------  From: Leonardo Gilbert MD   To: [facility] [employer] [facility] ([id number]);     Sent: 9/5/2019 12:07:33 PM CDT  Subject: General Message     Let Erica know that radiologist identified a 8mm pulmonary nodule in RUL which I would recommend CT chest w/o contrast for further evaluation.  I don't think this has any relationship with his current symptoms, but it should be explored.order placed and will wait for pt to return callpt calls back and discussed findings.  He would like to wait on scheduling CT and discuss with [place] when he goes there next month.  I recommended he get a copy of his films/CD to take with him and told him I would contact radiology and have them copy for him, he understands he will need to go to radiology to  the CD

## 2022-02-16 NOTE — PROGRESS NOTES
Patient:   ERICA MTZ SR            MRN: [id number]            FIN: [id number]               Age:   88 years     Sex:  Male     :  9/15/1928   Associated Diagnoses:   AAA (Abdominal Aortic Aneurysm); Coronary artery disease; Hypertension; PMR (Polymyalgia Rheumatica)   Author:   Leonardo Gilbert MD      Visit Information      Date of Service: 06/15/2017 05:04 pm  Performing Location: [facility]  Encounter#: [id number]      Primary Care Provider (PCP):  Leonardo Gilbert MD, NPI# [id number]      Referring Provider:  Leonardo Gilbert MD# [id number]      Chief Complaint   6/15/2017 5:17 PM CDT    f/u - not able to get in to see Rheum at [facility] until .  Never did start the methylprednisone since he thought he was seeing Rheum earlier.   Hives worsening              Additional Information:No additional information recorded during visit.   Chief complaint and symptoms as noted above and confirmed with patient.  Recent lab and diagnostic studies reviewed with patient      History of Present Illness   2016: Erica presents to clinic with several concerns.  He is primarily followed through the general medicine clinic at [facility] by Dr. Krishna Man.  He has a prior history of suspected coronary artery disease related to a positive nuclear stress test in .  He has been treated with medical management and has never undergone diagnostic heart catheterization.  He also has a suspected history of cerebrovascular disease and is maintained on both aspirin and Plavix.  Overall he is a very intelligent man who has good insight into his underlying medical care.  His primary concern is his blood pressure lability.  He provides meticulous detail of blood pressure readings at home.  His systolic pressures in the morning and the evenings are consistently in the 150s-160s range.  He shows morning blood pressures after breakfast (between 8 and 11 AM) with systolic readings between 90s-110s.  He describes having  orthostatic symptoms on a fairly predictable basis during this timeframe.  Currently he takes isosorbide mononitrate 30 mg in the morning and Toprol-XL 25 mg in the evening.  He also describes having a chest tightness when attempting to shovel snow on his driveway.  He does not feel this is angina.  He feels that symptoms are primarily brought on by the cold weather.  Denies any active chest pains at this time.  He does have nitroglycerin tablets available at home though has never felt he needed them.    11/9/2016: Returns to clinic for general follow-up.  He was seen by cardiology clinic at [place] in August of this year.  He has had his home blood pressure medication slowly advanced.  Currently taking Imdur 30 mg twice daily, Toprol-XL 75 mg in the evening and amlodipine 2.5 mg prior to bedtime.  He did complete a 24-hour amatory blood pressure monitor in August showing average systolic pressures less than 140.  Home readings typically in the high 140s.  No further presyncopal episodes.    5/25/2017: Presents to clinic with approximately 5 day history of left-sided gluteal sciatic pains extending down left leg.  Primary concern is for the past few weeks describes substantial proximal myalgias in both shoulders, through his torso and hip girdle.  He says symptoms are very similar to what he experienced when originally diagnosed with polymyalgia rheumatica approximately 10 years ago.  He recalls having very exquisite responsiveness to steroids at the time.  He recalls a fairly quick tapering off of prednisone, and was never on a maintenance phase of corticosteroids.  He did have bilateral temporal artery biopsies back in 2008 which were normal.  Denies any current headache or visual changes.    6/1/2017: Erica returns to clinic for one-week follow-up.  Laboratory testing from last week demonstrated normal sedimentation rate and a isolated elevation of his C-reactive protein.  He describes this as the same pattern  that was discovered approximately 8 years ago through [facility].  He was started on prednisone, initially at 40 mg daily for 3 days followed by a dose reduction 20 mg daily.  He states that the first day he took prednisone his symptoms fully evaporated and felt the best he has in several weeks.  Thereafter he began developing an urticarial rash across his arms, thighs, torso as well as a return of his previous fatigue base complaints.  No longer describing any myalgias.  Also describing a generalized headache.  He in general describes the last week as a disaster.     6/15/2017: Presents to clinic for two-week follow-up.  Since her last visit he opted not to start on methylprednisolone.  In general states that his headache is somewhat better than it was.  Probably myalgia symptoms not as profound as it had been.  His main concern is that his dermatologic rash has spread extensively across his torso and back.  Originally noticed rash at onset of taking prednisone, which she is now not taken for over 2 weeks.  He has [place] follow-up in mid July including with rheumatology.  He is planning on leaving for [place] this coming week.         Review of Systems   Constitutional:  Weakness, Fatigue, No fever, No chills.    Eye:  Negative except as documented in history of present illness.    Ear/Nose/Mouth/Throat:  Negative except as documented in history of present illness.    Respiratory:  No shortness of breath.    Cardiovascular:  No chest pain, No palpitations, No peripheral edema, No syncope.    Gastrointestinal:  No nausea, No vomiting, No abdominal pain.    Genitourinary:  No dysuria, No hematuria.    Hematology/Lymphatics:  Negative except as documented in history of present illness.    Endocrine:  No excessive thirst, No polyuria.    Immunologic:  No recurrent fevers.    Musculoskeletal:  Joint pain, Muscle pain, Decreased range of motion, No back pain.    Integumentary:  Rash, Pruritus.    Neurologic:   Alert and oriented X4, Headache, No numbness, No tingling.       Health Status   Allergies:    Allergic Reactions (Selected)  Severity Not Documented  Aspirin (No reactions were documented)   Medications:  (Selected)   Prescriptions  Prescribed  Diprolene 0.05% topical ointment: 1 karl, TOP, BID, # 50 g, 0 Refill(s), Type: Maintenance, Pharmacy: [facility], 1 karl top bid  Metoprolol Succinate ER 50 mg oral tablet, extended release: See Instructions, Instructions: TAKE ONE TABLET BY MOUTH EVERY DAY - ALONG WITH A 25MG TABLET, # 90 tab(s), 0 Refill(s), Type: Maintenance, Pharmacy: [facility], TAKE ONE TABLET BY MOUTH EVERY DAY - ALONG WITH A 25MG TABLET  ZyrTEC 10 mg oral tablet: 1 tab(s) ( 10 mg ), PO, Daily, # 30 tab(s), 3 Refill(s), Type: Maintenance, Pharmacy: [facility], 1 tab(s) po daily  amLODIPine 5 mg oral tablet: 1 tab(s) ( 5 mg ), po, daily, # 90 tab(s), 0 Refill(s), Type: Soft Stop, Pharmacy: [facility], 1 tab(s) po daily  methylPREDNISolone 32 mg oral tablet: 1 tab(s) ( 32 mg ), PO, Daily, # 14 tab(s), 0 Refill(s), Type: Maintenance, Pharmacy: [facility], 1 tab(s) po daily,x14 day(s)  Documented Medications  Documented  Flomax 0.4 mg oral capsule: 1 cap(s), PO, Daily, # 30 cap(s), 0  Nitrostat 0.4 mg sublingual tablet: 1 tab(s) ( 0.4 mg ), SL, prn, PRN: for chest pain, Type: Maintenance  Omega-3 oral capsule: 0 Refill(s), Type: Maintenance  Plavix 75 mg oral tablet: 1 tab(s), PO, Daily, # 30 tab(s), 0  Tums: See Instructions, Instructions: 250 mg chewed QOD, 0 Refill(s), Type: Maintenance  Vitamin D3 1000 intl units oral tablet: 1 tab(s) ( 1,000 International Unit ), po, bid, 0 Refill(s), Type: Maintenance  aspirin 81 mg oral tablet: 1 tab(s) ( 81 mg ), PO, Daily, 0 Refill(s), Type: Maintenance  clobetasol 0.05% topical ointment: 1 karl, top, bid, 0 Refill(s), Type:  Maintenance  finasteride 5 mg oral tablet: 1 tab(s), PO, Daily, # 30 tab(s), 0 Refill(s)  hydrocortisone 2.5% topical cream: 1 karl, TOP, bid, # 20 g, 0 Refill(s), Type: Maintenance  isosorbide mononitrate 30 mg oral tablet, extended release: 2 tab(s) ( 60 mg ), po, qam, 0 Refill(s), Type: Maintenance  metoprolol succinate 25 mg oral tablet, extended release: 1 tab(s) ( 25 mg ), po, daily, 0 Refill(s), Type: Maintenance  ranitidine 150 mg oral tablet: 1 tab(s) ( 150 mg ), po, daily, 0 Refill(s), Type: Maintenance  simvastatin 10 mg oral tablet: 1 tab(s) ( 10 mg ), PO, Once a day (at bedtime), # 30 tab(s), 0 Refill(s), Type: Maintenance  triamcinolone 0.1% topical cream: 1 karl, top, bid, 0 Refill(s), Type: Maintenance   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  Amaurosis fugax of left eye (362.34): Onset on 2002 at 74 years.  Osteopenia (733.90): Onset in  at 73 years.  PMR (Polymyalgia Rheumatica) (725): Onset in  at 69 years.  BPH (Benign Prostatic Hypertrophy) (600.00)  AAA (Abdominal Aortic Aneurysm) (441.4)   Family History:    CA - Lung cancer  Father ()  Bladder cancer  Mother ()     Procedure history:    Cholecystectomy ([id number]) on 2007 at 78 Years.  Upper GI endoscopy (4446592018) in  at 67 Years.   Social History:        Tobacco Assessment: Denies Tobacco Use            Past                     Comments:                      2011 - Greta James                      Smoked for a brief period of time. Quit back in the 1950s.      Employment and Education Assessment            Retired      Home and Environment Assessment            Marital status: .        Physical Examination   vital signs stable, as noted above   Vital Signs   6/15/2017 5:17 PM CDT Temperature Tympanic 97.8 DegF  LOW    Peripheral Pulse Rate 72 bpm    Systolic Blood Pressure 140 mmHg    Diastolic Blood Pressure 70 mmHg    Mean Arterial Pressure 93 mmHg    BP Site Right arm      Measurements from flowsheet : Measurements   6/15/2017 5:17 PM CDT    Weight Measured - Standard                171.8 lb     General:  Alert and oriented, No acute distress.    Eye:  Pupils are equal, round and reactive to light, Extraocular movements are intact, Normal conjunctiva.    HENT:  Normocephalic, Tympanic membranes are clear, Oral mucosa is moist, No mucositis.  Conjunctiva and oral mucosa appear normal.    Neck:  Supple, No carotid bruit, No jugular venous distention, No lymphadenopathy.    Respiratory:  Lungs are clear to auscultation, Respirations are non-labored.    Cardiovascular:  Normal rate, Regular rhythm, No murmur, No edema.    Gastrointestinal:  Soft.    Musculoskeletal:  Normal range of motion, Normal strength, No tenderness.    Integumentary:  diffuse urticarial rash across torso/back, arms, thighs - substantially worse compared to last visit.    Neurologic:  Alert, Oriented, Normal motor function, No focal deficits, Cranial Nerves II-XII are grossly intact.    Cognition and Speech:  Oriented, Speech clear and coherent.    Psychiatric:  Appropriate mood & affect.       Review / Management   Results review:  Lab results   6/1/2017 6:10 PM CDT Sed Rate 19   6/1/2017 5:57 PM CDT Sodium Level 134 mmol/L  LOW    Potassium Level 5.3 mmol/L    Chloride Level 101 mmol/L    CO2 Level 24 mmol/L    Glucose Level 142 mg/dL  HI    BUN 27 mg/dL  HI    Creatinine 1.36 mg/dL  HI    BUN/Creat Ratio 20     eGFR 46 mL/min/1.73m2  LOW    eGFR African American 53 mL/min/1.73m2  LOW    Calcium Level 9.0 mg/dL    Bili Total 0.4 mg/dL    Alk Phos 81 unit/L    AST/SGOT 67 unit/L  HI    ALT/SGPT 100 unit/L  HI     unit/L    Protein Total 6.4 gm/dL    Albumin Level 3.9 gm/dL    Globulin 2.5    A/G Ratio 1.6    C-Reactive Protein (CRP) 5.42 mg/dL  HI    Complement C3 110 mg/dL    Complement C4 33 mg/dL    Cryoglobulins, Ql NEGATIVE    U Protein 20 mg/dL    U Protein/Creatinine Ratio 256  HI    Ur Creatinine 78 mg/dL    WBC 11.4  HI    RBC 4.32    Hgb 12.8 gm/dL  LOW    Hct 37.8 %  LOW    MCV 87.5 fL    MCH 29.6 pg    MCHC 33.9 gm/dL    RDW 12.7 %    Platelet 239    MPV 9.9 fL    Lymphs 2.0 %    Abs Lymphs 228  LOW    Neutrophils 94.0 %    Abs Neutrophils 10,716  HI    Monocytes 3.0 %    Abs Monocytes 342    Eosinophils 1.0 %    Abs Eosinophils 114    Basophils 0 %    Abs Basophils 0    Plt Est ADEQUATE    Diff Comment Red cell morphology appears unremarkable    UA Color YELLOW    UA Appear CLEAR    UA pH 5.5    UA Specific Gravity 1.014    UA Glucose NEGATIVE    UA Bilirubin NEGATIVE    UA Ketones NEGATIVE    UA Blood NEGATIVE    UA Protein NEGATIVE    UA Nitrite NEGATIVE    UA Leuk Est NEGATIVE    UA Squam Epithelial NONE SEEN /HPF    UA Hyaline Cast NONE SEEN /LPF    UA WBC NONE SEEN /HPF    UA RBC NONE SEEN /HPF    UA Bacteria NONE SEEN /HPF    CHRISTIN 1:80 Titer    CHRISTIN IFA POSITIVE    CHRISTIN Pattern HOMOGENEOUS    ANCA NEGATIVE    Myeloperoxidase Ab <1.0    Serine Protease 3 Ab <1.0   5/25/2017 4:12 PM CDT C-Reactive Protein (CRP) 2.52 mg/dL  HI    Sed Rate 17   .       Impression and Plan   Diagnosis     AAA (Abdominal Aortic Aneurysm) (XRV42-FZ I71.4).     Coronary artery disease (ZBO77-OA I25.10).     Hypertension (FEU52-BV I10).     PMR (Polymyalgia Rheumatica) (LKS81-ZI M35.3).         .) myalgias, urticarial rash, headaches - all very concerning development of symptoms despite use of prednisone for the past 1 week.   I'm less convinced about PMR given progression of symptoms and no sustained improvement on moderate dose corticosteroids.  Concerns for vasculitis vs. drug reaction   - despite Rx for methylprednisone; Erica did not take as prescribed - highly encouraged him to start this given general deterioration of rash and still clinical concerns for potential vasculitis     - Erica agrees to take 16mg daily; would consider increasing to 32mg daily if tolerates   - advised using benadryl prn (did not find any relief with cetirizine)   - Erica wants to use topical steroid as well; Rx for diprolone 0.05% BID   - will make arrangements for dermatology assessment outside [place] for planned skin biopsy   - (5/25/2017): CRP 2.52, sed rate 17   - (6/1/2017): CRP: 5.4, normal complements, CHRISTIN 1:80, negative ANCA, U/A: bland,  WBC 11K (relative neutrophilia - had been on prednisone prior to labs)   - referral to [place] rheumatology.  Currently scheduled in July    plan as previously outlined:     .) Hypertension - recent ambulatory 24 hr bp monitor (8/2016); mean SBP in 130s  current antihypertensive regimen: Toprol XL 75mg (PM), ISMN 30mg BID, amlodipine 2.5mg qPM  regimen changes: increase amlodipine to 5mg   intolerance:  future titration/work-up plan:    -    .) CAD, suspected based on abnormal nuclear stress testing in '12 ([place]); no h/o angiography   - conservative mgmt via medical therapy given age            - maintained on dual anti-platelet therapy    .) infrarenal AAA   - last U/S measurement of 4.8cm - stable         Professional Services   Counseling Summary:  This was a 40 minute visit with greater than 50% of that time spent counseling the patient.

## 2022-02-16 NOTE — PROGRESS NOTES
Patient:   ERICA MTZ SR            MRN: [id number]            FIN: [id number]               Age:   89 years     Sex:  Male     :  9/15/1928   Associated Diagnoses:   AAA (Abdominal Aortic Aneurysm); Coronary artery disease; Hypertension; PMR (Polymyalgia Rheumatica)   Author:   Leonardo Gilbert MD      Visit Information      Date of Service: 2017 01:46 pm  Performing Location: [facility]  Encounter#: [id number]      Primary Care Provider (PCP):  Leonardo Gilbert MD    NPI# [id number]      Referring Provider:  Leonardo Gilbert MD# [id number]      Chief Complaint   2017 1:50 PM CST    Patient presents with sore throat, headache and nasal congestion x 3 days. Temperature of 100.0 lastnight.              Additional Information:No additional information recorded during visit.   Chief complaint and symptoms as noted above and confirmed with patient.  Recent lab and diagnostic studies reviewed with patient      History of Present Illness   2016: Erica presents to clinic with several concerns.  He is primarily followed through the general medicine clinic at [place] by Dr. Krishna Man.  He has a prior history of suspected coronary artery disease related to a positive nuclear stress test in .  He has been treated with medical management and has never undergone diagnostic heart catheterization.  He also has a suspected history of cerebrovascular disease and is maintained on both aspirin and Plavix.  Overall he is a very intelligent man who has good insight into his underlying medical care.  His primary concern is his blood pressure lability.  He provides meticulous detail of blood pressure readings at home.  His systolic pressures in the morning and the evenings are consistently in the 150s-160s range.  He shows morning blood pressures after breakfast (between 8 and 11 AM) with systolic readings between 90s-110s.  He describes having orthostatic symptoms on a fairly predictable basis during this  timeframe.  Currently he takes isosorbide mononitrate 30 mg in the morning and Toprol-XL 25 mg in the evening.  He also describes having a chest tightness when attempting to shovel snow on his driveway.  He does not feel this is angina.  He feels that symptoms are primarily brought on by the cold weather.  Denies any active chest pains at this time.  He does have nitroglycerin tablets available at home though has never felt he needed them.    11/9/2016: Returns to clinic for general follow-up.  He was seen by cardiology clinic at [place] in August of this year.  He has had his home blood pressure medication slowly advanced.  Currently taking Imdur 30 mg twice daily, Toprol-XL 75 mg in the evening and amlodipine 2.5 mg prior to bedtime.  He did complete a 24-hour amatory blood pressure monitor in August showing average systolic pressures less than 140.  Home readings typically in the high 140s.  No further presyncopal episodes.    5/25/2017: Presents to clinic with approximately 5 day history of left-sided gluteal sciatic pains extending down left leg.  Primary concern is for the past few weeks describes substantial proximal myalgias in both shoulders, through his torso and hip girdle.  He says symptoms are very similar to what he experienced when originally diagnosed with polymyalgia rheumatica approximately 10 years ago.  He recalls having very exquisite responsiveness to steroids at the time.  He recalls a fairly quick tapering off of prednisone, and was never on a maintenance phase of corticosteroids.  He did have bilateral temporal artery biopsies back in 2008 which were normal.  Denies any current headache or visual changes.    6/1/2017: Erica returns to clinic for one-week follow-up.  Laboratory testing from last week demonstrated normal sedimentation rate and a isolated elevation of his C-reactive protein.  He describes this as the same pattern that was discovered approximately 8 years ago through [facility].   He was started on prednisone, initially at 40 mg daily for 3 days followed by a dose reduction 20 mg daily.  He states that the first day he took prednisone his symptoms fully evaporated and felt the best he has in several weeks.  Thereafter he began developing an urticarial rash across his arms, thighs, torso as well as a return of his previous fatigue base complaints.  No longer describing any myalgias.  Also describing a generalized headache.  He in general describes the last week as a disaster.     6/15/2017: Presents to clinic for two-week follow-up.  Since her last visit he opted not to start on methylprednisolone.  In general states that his headache is somewhat better than it was.  Probably myalgia symptoms not as profound as it had been.  His main concern is that his dermatologic rash has spread extensively across his torso and back.  Originally noticed rash at onset of taking prednisone, which she is now not taken for over 2 weeks.  He has [facility] follow-up in mid July including with rheumatology.  He is planning on leaving [place] this coming week.    12/7/2017: Presents with 3 day history of upper respiratory tract symptoms.  Describes original onset of pharyngitis which has progressively worsened.  Symptoms accompanied by coryza, rhinorrhea, generalized congestion.  Low-grade fever at home.  No identified sick contacts.  No shortness of breath or sputum production.  Describes a significant difficulty with swallowing.  Feels like he has a continuous frog in his throat.         Review of Systems   Constitutional:  Fever, Weakness, Fatigue, No chills.    Eye:  Negative except as documented in history of present illness.    Ear/Nose/Mouth/Throat:  Nasal congestion, Sore throat.    Respiratory:  No shortness of breath, No cough.    Cardiovascular:  No chest pain, No palpitations, No peripheral edema, No syncope.    Gastrointestinal:  No nausea, No vomiting, No abdominal pain.    Genitourinary:   No dysuria, No hematuria.    Hematology/Lymphatics:  Negative except as documented in history of present illness.    Endocrine:  No excessive thirst, No polyuria.    Immunologic:  No recurrent fevers.    Musculoskeletal:  No back pain, No joint pain, No muscle pain, No decreased range of motion.    Integumentary:  No rash, No pruritus.    Neurologic:  Alert and oriented X4, Headache, No numbness, No tingling.       Health Status   Allergies:    Allergic Reactions (Selected)  Severity Not Documented  Aspirin (No reactions were documented)   Medications:  (Selected)   Prescriptions  Prescribed  Claritin-D 12 Hour oral tablet, extended release: 1 tab(s), PO, q12hr, # 14 tab(s), 0 Refill(s), Type: Maintenance  Diprolene 0.05% topical ointment: 1 karl, TOP, BID, # 50 g, 0 Refill(s), Type: Maintenance, Pharmacy: [facility], 1 karl top bid  Lidocaine/Benadryl/Maalox 1:1:1: Lidocaine/Benadryl/Maalox 1:1:1, See Instructions, Instructions: 5-10cc swish and spit TID prn for mouth pain, Supply, # 200 mL, 0 Refill(s), Type: Maintenance, 5-10cc swish and spit TID prn for mouth pain  Metoprolol Succinate ER 50 mg oral tablet, extended release: See Instructions, Instructions: TAKE ONE TABLET BY MOUTH EVERY DAY - ALONG WITH A 25MG TABLET, # 90 unknown unit, 1 Refill(s), Type: Maintenance, Pharmacy: [facility]  ZyrTEC 10 mg oral tablet: 1 tab(s) ( 10 mg ), PO, Daily, # 30 tab(s), 3 Refill(s), Type: Maintenance, Pharmacy: [facility], 1 tab(s) po daily  amLODIPine 5 mg oral tablet: 1 tab(s) ( 5 mg ), po, daily, # 90 tab(s), 0 Refill(s), Type: Maintenance, Pharmacy: [facility]  Documented Medications  Documented  Flomax 0.4 mg oral capsule: 1 cap(s), PO, Daily, # 30 cap(s), 0  Nitrostat 0.4 mg sublingual tablet: 1 tab(s) ( 0.4 mg ), SL, prn, PRN: for chest pain, Type: Maintenance  Omega-3 oral capsule: 0 Refill(s), Type: Maintenance  Plavix  75 mg oral tablet: 1 tab(s), PO, Daily, # 30 tab(s), 0  Tums: See Instructions, Instructions: 250 mg chewed QOD, 0 Refill(s), Type: Maintenance  Vitamin D3 1000 intl units oral tablet: 1 tab(s) ( 1,000 International Unit ), po, bid, 0 Refill(s), Type: Maintenance  aspirin 81 mg oral tablet: 1 tab(s) ( 81 mg ), PO, Daily, 0 Refill(s), Type: Maintenance  clobetasol 0.05% topical ointment: 1 karl, top, bid, 0 Refill(s), Type: Maintenance  finasteride 5 mg oral tablet: 1 tab(s), PO, Daily, # 30 tab(s), 0 Refill(s)  hydrocortisone 2.5% topical cream: 1 karl, TOP, bid, # 20 g, 0 Refill(s), Type: Maintenance  isosorbide mononitrate 30 mg oral tablet, extended release: 2 tab(s) ( 60 mg ), po, qam, 0 Refill(s), Type: Maintenance  ranitidine 150 mg oral tablet: 1 tab(s) ( 150 mg ), po, daily, 0 Refill(s), Type: Maintenance  simvastatin 10 mg oral tablet: 1 tab(s) ( 10 mg ), PO, Once a day (at bedtime), # 30 tab(s), 0 Refill(s), Type: Maintenance  triamcinolone 0.1% topical cream: 1 karl, top, bid, 0 Refill(s), Type: Maintenance   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  Amaurosis fugax of left eye (362.34): Onset on 2002 at 74 years.  Osteopenia (733.90): Onset in  at 73 years.  PMR (Polymyalgia Rheumatica) (725): Onset in  at 69 years.  BPH (Benign Prostatic Hypertrophy) (600.00)  AAA (Abdominal Aortic Aneurysm) (441.4)   Family History:    CA - Lung cancer  Father ()  Bladder cancer  Mother  ()     Procedure history:    Cholecystectomy (83290709) on 2007 at 78 Years.  Upper GI endoscopy (7303654053) in  at 67 Years.   Social History:        Tobacco Assessment: Denies Tobacco Use            Past                     Comments:                      2011 - Greta James                     Smoked for a brief period of time. Quit back in the 1950s.      Employment and Education Assessment            Retired      Home and Environment Assessment            Marital status: .        Physical Examination   vital signs stable, as noted above   Vital Signs   2017 1:50 PM CST Temperature Tympanic 98.4 DegF    Peripheral Pulse Rate 84 bpm    Systolic Blood Pressure 134 mmHg    Diastolic Blood Pressure 72 mmHg    Mean Arterial Pressure 93 mmHg    Oxygen Saturation 98 %      Measurements from flowsheet : Measurements   2017 1:50 PM CST Height Measured - Standard 68.25 in    Weight Measured - Standard 176.6 lb    BSA 1.96 m2    Body Mass Index 26.65 kg/m2      General:  Alert and oriented, No acute distress.    Eye:  Pupils are equal, round and reactive to light, Extraocular movements are intact, Normal conjunctiva.    HENT:  Normocephalic, Tympanic membranes are clear, Oral mucosa is moist, No mucositis.  Conjunctiva and oral mucosa appear normal, Diffuse soft palate edema; especially uvula which extends beyond base of tongue.  No assymetry or pharyngeal deviation.  Tongue and soft palate obscuring good visualization of pharynx.    Neck:  Supple, Non-tender, No lymphadenopathy.    Respiratory:  Lungs are clear to auscultation, Respirations are non-labored, Breath sounds are equal.    Cardiovascular:  Normal rate, Regular rhythm, No murmur, No edema.    Gastrointestinal:  Soft.    Neurologic:  Alert, Oriented.    Cognition and Speech:  Oriented, Speech clear and coherent.    Psychiatric:  Appropriate mood & affect.       Review / Management   Results review:  Lab results:  12/7/2017 2:22 PM CST    Influenza Ag              Negative for Influenza A antigen; Negative for Influenza B antigen  .       Impression and Plan   Diagnosis     AAA (Abdominal Aortic Aneurysm) (FCF49-VM I71.4).     Coronary artery disease (YXU94-QQ I25.10).     Hypertension (ZKP51-MD I10).     PMR (Polymyalgia Rheumatica) (ZVJ27-FZ M35.3).         .) cold, pharyngitis   - looks like more localized edema of soft palate causing swallowing concerns; no suppurative pharyngitis/abscess features   - influenza antigen negative   - Rx for magic mouthwash with viscous lidocaine, cepacol lozenges   - if not helping, can trial Claritin-D for 3-5 day trial   - if still without copious secretions and unable to swallow; should be reassessed in clinic to make sure no tonsillar abscess development    plan as previously outlined:     .) Hypertension - recent ambulatory 24 hr bp monitor (8/2016); mean SBP in 130s  current antihypertensive regimen: Toprol XL 75mg (PM), ISMN 30mg BID, amlodipine 2.5mg qPM  regimen changes: increase amlodipine to 5mg   intolerance:  future titration/work-up plan:    -    .) CAD, suspected based on abnormal nuclear stress testing in '12 ([place]); no h/o angiography   - conservative mgmt via medical therapy given age            - maintained on dual anti-platelet therapy    .) infrarenal AAA   - last U/S measurement of 4.8cm - stable         Professional Services   Counseling Summary:  This was a 15 minute visit with greater than 50% of that time spent counseling the patient.

## 2022-02-16 NOTE — PROGRESS NOTES
Patient:   ERICA MTZ SR            MRN: [id number]            FIN: [id number]               Age:   89 years     Sex:  Male     :  9/15/1928   Associated Diagnoses:   AAA (Abdominal Aortic Aneurysm); Coronary artery disease; Hypertension; PMR (Polymyalgia Rheumatica)   Author:   Leonardo Gilbert MD      Visit Information      Date of Service: 2019 11:00 am  Performing Location: [facility]  Encounter#: [id number]      Primary Care Provider (PCP):  Leonardo Gilbert MD    NPI# [id number]      Referring Provider:  Leonardo Gilbert MD, NPI# [id number]      Chief Complaint   2019 11:22 AM CDT    fell yesterday injuring his left hand/wirst.  Abrasion to left knee              Additional Information:No additional information recorded during visit.   Chief complaint and symptoms as noted above and confirmed with patient.  Recent lab and diagnostic studies reviewed with patient      History of Present Illness   2016: Erica presents to clinic with several concerns.  He is primarily followed through the general medicine clinic at [place] by Dr. Krishna Man.  He has a prior history of suspected coronary artery disease related to a positive nuclear stress test in .  He has been treated with medical management and has never undergone diagnostic heart catheterization.  He also has a suspected history of cerebrovascular disease and is maintained on both aspirin and Plavix.  Overall he is a very intelligent man who has good insight into his underlying medical care.  His primary concern is his blood pressure lability.  He provides meticulous detail of blood pressure readings at home.  His systolic pressures in the morning and the evenings are consistently in the 150s-160s range.  He shows morning blood pressures after breakfast (between 8 and 11 AM) with systolic readings between 90s-110s.  He describes having orthostatic symptoms on a fairly predictable basis during this timeframe.  Currently he takes  isosorbide mononitrate 30 mg in the morning and Toprol-XL 25 mg in the evening.  He also describes having a chest tightness when attempting to shovel snow on his driveway.  He does not feel this is angina.  He feels that symptoms are primarily brought on by the cold weather.  Denies any active chest pains at this time.  He does have nitroglycerin tablets available at home though has never felt he needed them.      8/9/2019: Erica fell on outstretched hand one day ago.  He trips in a parking lot.  Fell onto his left hand.  Has become more swollen and painful since the accident.  No numbness or weakness in the hand.         Review of Systems   Constitutional:  No fever, No chills.    Eye:  Negative except as documented in history of present illness.    Ear/Nose/Mouth/Throat:  No nasal congestion, No sore throat.    Respiratory:  No shortness of breath, No cough.    Cardiovascular   Gastrointestinal:  No nausea, No vomiting, No diarrhea, No abdominal pain.    Genitourinary:  No dysuria, No hematuria.    Hematology/Lymphatics:  Negative except as documented in history of present illness.    Endocrine:  No excessive thirst, No polyuria.    Immunologic:  No recurrent fevers.    Musculoskeletal:  Joint pain, Muscle pain, Decreased range of motion, Trauma, No back pain.    Integumentary:  No rash, No pruritus.    Neurologic:  Alert and oriented X4, No numbness, No tingling, No headache.       Health Status   Allergies:    Nonallergic Reactions (Selected)  Severity Not Documented  Augmentin (Diarrhea)   Medications:  (Selected)   Prescriptions  Prescribed  Diprolene 0.05% topical ointment: 1 karl, TOP, BID, # 50 g, 0 Refill(s), Type: Maintenance, Pharmacy: [facility], 1 karl top bid  Lidocaine/Benadryl/Maalox 1:1:1: Lidocaine/Benadryl/Maalox 1:1:1, See Instructions, Instructions: 5-10cc swish and spit TID prn for mouth pain, Supply, # 200 mL, 0 Refill(s), Type: Maintenance, 5-10cc swish and spit TID  prn for mouth pain  Metoprolol Succinate ER 50 mg oral tablet, extended release: See Instructions, Instructions: TAKE ONE TABLET BY MOUTH EVERY DAY - ALONG WITH A 25MG TABLET, # 90 unknown unit, 1 Refill(s), Type: Maintenance, Pharmacy: [facility]  Documented Medications  Documented  Flomax 0.4 mg oral capsule: 1 cap(s), PO, Daily, # 30 cap(s), 0  Metoprolol Succinate ER 25 mg oral tablet, extended release: = 1 tab(s) ( 25 mg ), Oral, daily, 0 Refill(s), Type: Soft Stop  Omega-3 oral capsule: 0 Refill(s), Type: Maintenance  Plavix 75 mg oral tablet: 1 tab(s), PO, Daily, # 30 tab(s), 0  Tums: See Instructions, Instructions: 250 mg chewed QOD, 0 Refill(s), Type: Maintenance  Vitamin D3 1000 intl units oral tablet: 1 tab(s) ( 1,000 International Unit ), po, bid, 0 Refill(s), Type: Maintenance  amLODIPine 2.5 mg oral tablet: 0 Refill(s), Type: Soft Stop  aspirin 81 mg oral tablet: 1 tab(s) ( 81 mg ), PO, Daily, 0 Refill(s), Type: Maintenance  clobetasol 0.05% topical ointment: 1 karl, top, bid, 0 Refill(s), Type: Maintenance  clopidogrel 75 mg oral tablet: 0 Refill(s), Type: Soft Stop  finasteride 5 mg oral tablet: 1 tab(s), PO, Daily, # 30 tab(s), 0 Refill(s)  hydrocortisone 2.5% topical cream: 1 karl, TOP, bid, # 20 g, 0 Refill(s), Type: Maintenance  isosorbide mononitrate 30 mg oral tablet, extended release: 2 tab(s) ( 60 mg ), po, qam, 0 Refill(s), Type: Maintenance  nitroglycerin 0.4 mg sublingual tablet: 0 Refill(s), Type: Soft Stop  ranitidine 150 mg oral tablet: See Instructions, Instructions: 0.5 tab, 0 Refill(s), Type: Maintenance  simvastatin 10 mg oral tablet: 1 tab(s) ( 10 mg ), PO, Once a day (at bedtime), # 30 tab(s), 0 Refill(s), Type: Maintenance  triamcinolone 0.1% topical cream: 1 karl, top, bid, 0 Refill(s), Type: Maintenance,    Medications          *denotes recorded medication          Lidocaine/Benadryl/Maalox 1:1:1: See Instructions, 5-10cc swish and spit TID prn for mouth  pain, 200 mL, 0 Refill(s).          *amLODIPine 2.5 mg oral tablet: 0 Refill(s).          *aspirin 81 mg oral tablet: 81 mg, 1 tab(s), PO, Daily, 0 Refill(s).          Diprolene 0.05% topical ointment: 1 karl, TOP, BID, 50 g, 0 Refill(s).          *Tums: See Instructions, 250 mg chewed QOD, 0 Refill(s).          *Vitamin D3 1000 intl units oral tablet: 1,000 International Unit, 1 tab(s), po, bid, 0 Refill(s).          *clobetasol 0.05% topical ointment: 1 karl, top, bid, 0 Refill(s).          *Plavix 75 mg oral tablet: 1 tab(s), PO, Daily, 30 tab(s).          *clopidogrel 75 mg oral tablet: 0 Refill(s).          *finasteride 5 mg oral tablet: 1 tab(s), PO, Daily, 30 tab(s).          *hydrocortisone 2.5% topical cream: 1 karl, TOP, bid, 20 g, 0 Refill(s).          *isosorbide mononitrate 30 mg oral tablet, extended release: 60 mg, 2 tab(s), po, qam, 0 Refill(s).          Metoprolol Succinate ER 50 mg oral tablet, extended release: See Instructions, TAKE ONE TABLET BY MOUTH EVERY DAY - ALONG WITH A 25MG TABLET, 90 unknown unit, 1 Refill(s).          *Metoprolol Succinate ER 25 mg oral tablet, extended release: 25 mg, 1 tab(s), Oral, daily, 0 Refill(s).          *nitroglycerin 0.4 mg sublingual tablet: 0 Refill(s).          *Omega-3 oral capsule: 0 Refill(s).          *ranitidine 150 mg oral tablet: See Instructions, 0.5 tab, 0 Refill(s).          *simvastatin 10 mg oral tablet: 10 mg, 1 tab(s), PO, Once a day (at bedtime), 30 tab(s), 0 Refill(s).          *Flomax 0.4 mg oral capsule: 1 cap(s), PO, Daily, 30 cap(s).          *triamcinolone 0.1% topical cream: 1 karl, top, bid, 0 Refill(s).       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  Amaurosis fugax of left eye (362.34): Onset on 2002 at 74 years.  Osteopenia (733.90): Onset in  at 73 years.  PMR (Polymyalgia Rheumatica) (725): Onset in  at 69 years.  BPH (Benign Prostatic Hypertrophy) (600.00)  AAA (Abdominal Aortic Aneurysm) (441.4)   Family History:    CA - Lung cancer  Father ()  Bladder cancer  Mother ()     Procedure history:    Cholecystectomy (08124454) on 2007 at 78 Years.  Upper GI endoscopy (2062326362) in  at 67 Years.   Social History:        Tobacco Assessment: Denies Tobacco Use            Past                     Comments:                      2011 - Greta James                     Smoked for a brief period of time. Quit back in the 1950s.      Employment and Education Assessment            Retired      Home and Environment Assessment            Marital status: .        Physical Examination   vital signs stable, as noted above   Vital Signs   2019 11:22 AM CDT Temperature Tympanic 97.6 DegF  LOW    Peripheral Pulse Rate 72 bpm    Systolic Blood Pressure 120 mmHg    Diastolic Blood Pressure 72 mmHg    Mean Arterial Pressure 88 mmHg      Measurements from flowsheet : Measurements   2019 11:22 AM CDT    Weight Measured - Standard                174.12 lb     General:  Alert and oriented, No acute distress.    HENT:  Normocephalic.    Neck:  Supple.    Respiratory:  Respirations are non-labored.    Cardiovascular:  Normal rate.    Musculoskeletal:  focal soft tissue swelling involving left wrist without deformity.  Some ecchymoses limited to palmar aspect of wrist.  Some focal tenderness to palpation along ulnar bone; no pains along radial head.  Neurovascularly intact.    Neurologic:   Alert, Oriented.    Cognition and Speech:  Oriented, Speech clear and coherent.    Psychiatric:  Appropriate mood & affect.       Review / Management   Results review      Impression and Plan   Diagnosis     AAA (Abdominal Aortic Aneurysm) (NPP03-CZ I71.4).     Coronary artery disease (MGJ20-ZF I25.10).     Hypertension (FNA59-ZK I10).     PMR (Polymyalgia Rheumatica) (WGV81-WH M35.3).         .) left wrist pains after fall on outstretched hand  - Xrays obtained: no evidence of fracture  - offered wrist ACE wrapping - he declined   - recommending RICE, APAP prn    plan as previously outlined:     .) Hypertension - recent ambulatory 24 hr bp monitor (8/2016); mean SBP in 130s  current antihypertensive regimen: Toprol XL 75mg (PM), ISMN 30mg BID, amlodipine 2.5mg qPM  regimen changes: increase amlodipine to 5mg   intolerance:  future titration/work-up plan:    -    .) CAD, suspected based on abnormal nuclear stress testing in '12 ([place]); no h/o angiography   - conservative mgmt via medical therapy given age            - maintained on dual anti-platelet therapy    .) infrarenal AAA   - last U/S measurement of 4.8cm - stable         Professional Services   Counseling Summary:  This was a 15 minute visit with greater than 50% of that time spent counseling the patient.

## 2022-02-16 NOTE — NURSING NOTE
Vital Signs Entered On:  9/22/2021 11:09 AM CDT    Performed On:  9/22/2021 11:09 AM CDT by Ashia Hooker CMA               Vital Signs   Systolic Blood Pressure :   144 mmHg (HI)    Diastolic Blood Pressure :   70 mmHg   Mean Arterial Pressure :   95 mmHg   Ashia Hooker CMA - 9/22/2021 11:09 AM CDT

## 2022-02-16 NOTE — PROGRESS NOTES
Patient:   ERICA MTZ SR            MRN: [id number]            FIN: [id number]               Age:   89 years     Sex:  Male     :  9/15/1928   Associated Diagnoses:   AAA (Abdominal Aortic Aneurysm); Coronary artery disease; Hypertension; PMR (Polymyalgia Rheumatica)   Author:   Leonardo Gilbert MD      Visit Information      Date of Service: 12/15/2017 01:26 pm  Performing Location: [facility]  Encounter#: [id number]      Primary Care Provider (PCP):  Leonardo Gilbert MD    NPI# [id number]      Referring Provider:  Leonardo Gilbert MD# [id number]      Chief Complaint   12/15/2017 1:48 PM CST   Diarrhea starting this am after taking 4 doses of antbx              Additional Information:No additional information recorded during visit.   Chief complaint and symptoms as noted above and confirmed with patient.  Recent lab and diagnostic studies reviewed with patient      History of Present Illness   2016: Erica presents to clinic with several concerns.  He is primarily followed through the general medicine clinic at [place] by Dr. Krishna Man.  He has a prior history of suspected coronary artery disease related to a positive nuclear stress test in .  He has been treated with medical management and has never undergone diagnostic heart catheterization.  He also has a suspected history of cerebrovascular disease and is maintained on both aspirin and Plavix.  Overall he is a very intelligent man who has good insight into his underlying medical care.  His primary concern is his blood pressure lability.  He provides meticulous detail of blood pressure readings at home.  His systolic pressures in the morning and the evenings are consistently in the 150s-160s range.  He shows morning blood pressures after breakfast (between 8 and 11 AM) with systolic readings between 90s-110s.  He describes having orthostatic symptoms on a fairly predictable basis during this timeframe.  Currently he takes isosorbide  mononitrate 30 mg in the morning and Toprol-XL 25 mg in the evening.  He also describes having a chest tightness when attempting to shovel snow on his driveway.  He does not feel this is angina.  He feels that symptoms are primarily brought on by the cold weather.  Denies any active chest pains at this time.  He does have nitroglycerin tablets available at home though has never felt he needed them.    11/9/2016: Returns to clinic for general follow-up.  He was seen by cardiology clinic at [place] in August of this year.  He has had his home blood pressure medication slowly advanced.  Currently taking Imdur 30 mg twice daily, Toprol-XL 75 mg in the evening and amlodipine 2.5 mg prior to bedtime.  He did complete a 24-hour amatory blood pressure monitor in August showing average systolic pressures less than 140.  Home readings typically in the high 140s.  No further presyncopal episodes.    5/25/2017: Presents to clinic with approximately 5 day history of left-sided gluteal sciatic pains extending down left leg.  Primary concern is for the past few weeks describes substantial proximal myalgias in both shoulders, through his torso and hip girdle.  He says symptoms are very similar to what he experienced when originally diagnosed with polymyalgia rheumatica approximately 10 years ago.  He recalls having very exquisite responsiveness to steroids at the time.  He recalls a fairly quick tapering off of prednisone, and was never on a maintenance phase of corticosteroids.  He did have bilateral temporal artery biopsies back in 2008 which were normal.  Denies any current headache or visual changes.    6/1/2017: Erica returns to clinic for one-week follow-up.  Laboratory testing from last week demonstrated normal sedimentation rate and a isolated elevation of his C-reactive protein.  He describes this as the same pattern that was discovered approximately 8 years ago through [facility].  He was started on prednisone, initially  at 40 mg daily for 3 days followed by a dose reduction 20 mg daily.  He states that the first day he took prednisone his symptoms fully evaporated and felt the best he has in several weeks.  Thereafter he began developing an urticarial rash across his arms, thighs, torso as well as a return of his previous fatigue base complaints.  No longer describing any myalgias.  Also describing a generalized headache.  He in general describes the last week as a disaster.     6/15/2017: Presents to clinic for two-week follow-up.  Since her last visit he opted not to start on methylprednisolone.  In general states that his headache is somewhat better than it was.  Probably myalgia symptoms not as profound as it had been.  His main concern is that his dermatologic rash has spread extensively across his torso and back.  Originally noticed rash at onset of taking prednisone, which she is now not taken for over 2 weeks.  He has [facility] follow-up in mid July including with rheumatology.  He is planning on leaving [place] this coming week.    12/7/2017:Presents with 3 day history of upper respiratory tract symptoms.  Describes original onset of pharyngitis which has progressively worsened.  Symptoms accompanied by coryza, rhinorrhea, generalized congestion.  Low-grade fever at home.  No identified sick contacts.  No shortness of breath or sputum production.  Describes a significant difficulty with swallowing.  Feels like he has a continuous frog in his throat.    12/18/2017: Returns to clinic for follow-up. Since our last visit, seen by GJM in clinic and SATYA in ED on same day.  Prescribed Augmentin - seemed to help sinus symptoms though now troubled by diarrhea.  Presented to ED with acute left hip pains, unable to walk. Xray showing no fracture.  Treated with prednisone and dilaudid.           Review of Systems   Constitutional:  No fever, No chills, No weakness, No fatigue.    Eye:  Negative except as documented in  history of present illness.    Ear/Nose/Mouth/Throat:  Nasal congestion, No sore throat.    Respiratory:  No shortness of breath, No cough.    Cardiovascular:  No chest pain, No palpitations, No peripheral edema, No syncope.    Gastrointestinal:  Diarrhea, No nausea, No vomiting, No abdominal pain.    Genitourinary:  No dysuria, No hematuria.    Hematology/Lymphatics:  Negative except as documented in history of present illness.    Endocrine:  No excessive thirst, No polyuria.    Immunologic:  No recurrent fevers.    Musculoskeletal:  Joint pain, No back pain, No muscle pain, No decreased range of motion.    Integumentary:  No rash, No pruritus.    Neurologic:  Alert and oriented X4, Headache, No numbness, No tingling.       Health Status   Allergies:    Nonallergic Reactions (Selected)  Severity Not Documented  Augmentin (Diarrhea)   Medications:  (Selected)   Prescriptions  Prescribed  Augmentin 875 mg oral tablet: 1 tab(s), PO, q12hr, # 14 tab(s), 0 Refill(s), Type: Maintenance, Pharmacy: [facility], 1 tab(s) po q12 hrs,x7 day(s)  Claritin-D 12 Hour oral tablet, extended release: 1 tab(s), PO, q12hr, # 14 tab(s), 0 Refill(s), Type: Maintenance  Diprolene 0.05% topical ointment: 1 karl, TOP, BID, # 50 g, 0 Refill(s), Type: Maintenance, Pharmacy: [facility], 1 karl top bid  Lidocaine/Benadryl/Maalox 1:1:1: Lidocaine/Benadryl/Maalox 1:1:1, See Instructions, Instructions: 5-10cc swish and spit TID prn for mouth pain, Supply, # 200 mL, 0 Refill(s), Type: Maintenance, 5-10cc swish and spit TID prn for mouth pain  Metoprolol Succinate ER 50 mg oral tablet, extended release: See Instructions, Instructions: TAKE ONE TABLET BY MOUTH EVERY DAY - ALONG WITH A 25MG TABLET, # 90 unknown unit, 1 Refill(s), Type: Maintenance, Pharmacy: [facility]  ZyrTEC 10 mg oral tablet: 1 tab(s) ( 10 mg ), PO, Daily, # 30 tab(s), 3 Refill(s), Type: Maintenance, [facility], 1 tab(s) po daily  amLODIPine 5 mg oral tablet: 1 tab(s) ( 5 mg ), po, daily, # 90 tab(s), 0 Refill(s), Type: Maintenance, [facility]  Documented Medications  Documented  Flomax 0.4 mg oral capsule: 1 cap(s), PO, Daily, # 30 cap(s), 0  Nitrostat 0.4 mg sublingual tablet: 1 tab(s) ( 0.4 mg ), SL, prn, PRN: for chest pain, Type: Maintenance  Omega-3 oral capsule: 0 Refill(s), Type: Maintenance  Plavix 75 mg oral tablet: 1 tab(s), PO, Daily, # 30 tab(s), 0  Tums: See Instructions, Instructions: 250 mg chewed QOD, 0 Refill(s), Type: Maintenance  Vitamin D3 1000 intl units oral tablet: 1 tab(s) ( 1,000 International Unit ), po, bid, 0 Refill(s), Type: Maintenance  aspirin 81 mg oral tablet: 1 tab(s) ( 81 mg ), PO, Daily, 0 Refill(s), Type: Maintenance  clobetasol 0.05% topical ointment: 1 karl, top, bid, 0 Refill(s), Type: Maintenance  finasteride 5 mg oral tablet: 1 tab(s), PO, Daily, # 30 tab(s), 0 Refill(s)  hydrocortisone 2.5% topical cream: 1 karl, TOP, bid, # 20 g, 0 Refill(s), Type: Maintenance  isosorbide mononitrate 30 mg oral tablet, extended release: 2 tab(s) ( 60 mg ), po, qam, 0 Refill(s), Type: Maintenance  predniSONE 10 mg oral tablet: See Instructions, Instructions: tapering dose, 0 Refill(s), Type: Maintenance  ranitidine 150 mg oral tablet: 1 tab(s) ( 150 mg ), po, daily, 0 Refill(s), Type: Maintenance  simvastatin 10 mg oral tablet: 1 tab(s) ( 10 mg ), PO, Once a day (at bedtime), # 30 tab(s), 0 Refill(s), Type: Maintenance  triamcinolone 0.1% topical cream: 1 karl, top, bid, 0 Refill(s), Type: Maintenance   Problem list:    All Problems  AAA (Abdominal Aortic Aneurysm) / ICD-9-.4 / Confirmed  Amaurosis fugax of left eye / ICD-9-.34 / Confirmed  BPH (Benign Prostatic Hypertrophy) / ICD-9-.00 / Confirmed  Osteopenia / ICD-9-.90 / Confirmed  Peptic Disease / ICD-9-.90 / Confirmed  PMR (Polymyalgia  Rheumatica) / ICD-9- / Confirmed  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  Amaurosis fugax of left eye (362.34): Onset on 2002 at 74 years.  Osteopenia (733.90): Onset in  at 73 years.  PMR (Polymyalgia Rheumatica) (725): Onset in  at 69 years.  BPH (Benign Prostatic Hypertrophy) (600.00)  AAA (Abdominal Aortic Aneurysm) (441.4)   Family History:    CA - Lung cancer  Father ()  Bladder cancer  Mother ()     Procedure history:    Cholecystectomy (22949071) on 2007 at 78 Years.  Upper GI endoscopy (4602748793) in  at 67 Years.   Social History:        Tobacco Assessment: Denies Tobacco Use            Past                     Comments:                      2011 - Greta James                     Smoked for a brief period of time. Quit back in the 1950s.      Employment and Education Assessment            Retired      Home and Environment Assessment            Marital status: .        Physical Examination   vital signs stable, as noted above   Vital Signs   12/15/2017 1:48 PM CST Temperature Tympanic 97.8 DegF  LOW    Peripheral Pulse Rate 56 bpm  LOW    Systolic Blood Pressure 150 mmHg  HI    Diastolic Blood Pressure 72 mmHg    Mean Arterial Pressure 98 mmHg    BP Site Right arm      Measurements from flowsheet : Measurements   12/15/2017 1:48 PM CST   Weight Measured - Standard                178.8 lb     General:  Alert and oriented, No acute distress.    Eye:  Pupils are equal, round and reactive to light, Extraocular movements are intact, Normal conjunctiva.    HENT:  Normocephalic, Tympanic membranes are clear, Oral mucosa is moist.    Neck:  Supple, Non-tender, No lymphadenopathy.    Respiratory:  Lungs are clear to auscultation, Respirations are non-labored, Breath sounds are equal.     Cardiovascular:  Normal rate, Regular rhythm, No murmur, No edema.    Gastrointestinal:  Soft, Non-tender, Non-distended.    Neurologic:  Alert, Oriented.    Cognition and Speech:  Oriented, Speech clear and coherent.    Psychiatric:  Appropriate mood & affect.       Review / Management   Results review:  Lab results: 12/7/2017 2:22 PM CST    Influenza Ag              Negative for Influenza A antigen; Negative for Influenza B antigen  .       Impression and Plan   Diagnosis     AAA (Abdominal Aortic Aneurysm) (CLB80-MB I71.4).     Coronary artery disease (KXL87-ZI I25.10).     Hypertension (EAT91-OZ I10).     PMR (Polymyalgia Rheumatica) (SXP06-SQ M35.3).         .) ER f/u for left hip pains; resolving  - advised to complete out prednisone taper as prescribed  - resolving rhinosinusitis - no further Abx    plan as previously outlined:     .) Hypertension - recent ambulatory 24 hr bp monitor (8/2016); mean SBP in 130s  current antihypertensive regimen: Toprol XL 75mg (PM), ISMN 30mg BID, amlodipine 2.5mg qPM  regimen changes: increase amlodipine to 5mg   intolerance:  future titration/work-up plan:    -    .) CAD, suspected based on abnormal nuclear stress testing in '12 ([facility]); no h/o angiography   - conservative mgmt via medical therapy given age            - maintained on dual anti-platelet therapy    .) infrarenal AAA   - last U/S measurement of 4.8cm - stable         Professional Services   Counseling Summary:  This was a 15 minute visit with greater than 50% of that time spent counseling the patient.

## 2022-02-16 NOTE — NURSING NOTE
Comprehensive Intake Entered On:  8/9/2019 11:24 AM CDT    Performed On:  8/9/2019 11:22 AM CDT by Ashia Hooker CMA               Summary   Chief Complaint :   fell yesterday injuring his left hand/wirst.  Abrasion to left knee   Advance Directive :   Yes   Weight Measured :   174.12 lb(Converted to: 174 lb 2 oz, 78.98 kg)    Systolic Blood Pressure :   120 mmHg   Diastolic Blood Pressure :   72 mmHg   Mean Arterial Pressure :   88 mmHg   Peripheral Pulse Rate :   72 bpm   Temperature Tympanic :   97.6 DegF(Converted to: 36.4 DegC)  (LOW)    Ashia Hooker CMA - 8/9/2019 11:22 AM CDT

## 2022-02-16 NOTE — PROGRESS NOTES
Patient:   LILIANA MTZ SR            MRN: [id number]            FIN: [id number]               Age:   89 years     Sex:  Male     :  9/15/1928   Associated Diagnoses:   Acute maxillary sinusitis   Author:   Jignesh Santos MD      Visit Information      Date of Service: 2017 03:31 pm  Performing Location: [facility]  Encounter#: [id number]      Primary Care Provider (PCP):  Leonardo Gilbert MD    NPI# [id number]      Referring Provider:  Jignesh Santos MD    NPI# [id number]      Chief Complaint   2017 3:33 PM CST   Dysphagia has improved, productive cough.        History of Present Illness   Had difficulty swallowing last week. Given lozenges. Swallowing issues are improved. Denies dysphagia. Having a lot of sinus drainage causing coughing. Has had sinus pressure for a week with the drainage. Having to sleep in a recliner as unable to lie flat from the drainage. Having an associated headache on the top of the head.      Review of Systems   Constitutional:  No fever.    Respiratory:  No shortness of breath.    Gastrointestinal:  No vomiting.       Health Status   Allergies:    Allergic Reactions (Selected)  No Known Medication Allergies   Medications:  (Selected)   Prescriptions  Prescribed  Claritin-D 12 Hour oral tablet, extended release: 1 tab(s), PO, q12hr, # 14 tab(s), 0 Refill(s), Type: Maintenance  Diprolene 0.05% topical ointment: 1 karl, TOP, BID, # 50 g, 0 Refill(s), Type: Maintenance, Pharmacy: [facility], 1 karl top bid  Lidocaine/Benadryl/Maalox 1:1:1: Lidocaine/Benadryl/Maalox 1:1:1, See Instructions, Instructions: 5-10cc swish and spit TID prn for mouth pain, Supply, # 200 mL, 0 Refill(s), Type: Maintenance, 5-10cc swish and spit TID prn for mouth pain  Metoprolol Succinate ER 50 mg oral tablet, extended release: See Instructions, Instructions: TAKE ONE TABLET BY MOUTH EVERY DAY - ALONG WITH A 25MG TABLET, # 90 unknown unit, 1 Refill(s), Type:  Maintenance, Pharmacy: [facility]  ZyrTEC 10 mg oral tablet: 1 tab(s) ( 10 mg ), PO, Daily, # 30 tab(s), 3 Refill(s), Type: Maintenance, Pharmacy: [facility], 1 tab(s) po daily  amLODIPine 5 mg oral tablet: 1 tab(s) ( 5 mg ), po, daily, # 90 tab(s), 0 Refill(s), Type: Maintenance, Pharmacy: [facility]  Documented Medications  Documented  Flomax 0.4 mg oral capsule: 1 cap(s), PO, Daily, # 30 cap(s), 0  Nitrostat 0.4 mg sublingual tablet: 1 tab(s) ( 0.4 mg ), SL, prn, PRN: for chest pain, Type: Maintenance  Omega-3 oral capsule: 0 Refill(s), Type: Maintenance  Plavix 75 mg oral tablet: 1 tab(s), PO, Daily, # 30 tab(s), 0  Tums: See Instructions, Instructions: 250 mg chewed QOD, 0 Refill(s), Type: Maintenance  Vitamin D3 1000 intl units oral tablet: 1 tab(s) ( 1,000 International Unit ), po, bid, 0 Refill(s), Type: Maintenance  aspirin 81 mg oral tablet: 1 tab(s) ( 81 mg ), PO, Daily, 0 Refill(s), Type: Maintenance  clobetasol 0.05% topical ointment: 1 karl, top, bid, 0 Refill(s), Type: Maintenance  finasteride 5 mg oral tablet: 1 tab(s), PO, Daily, # 30 tab(s), 0 Refill(s)  hydrocortisone 2.5% topical cream: 1 karl, TOP, bid, # 20 g, 0 Refill(s), Type: Maintenance  isosorbide mononitrate 30 mg oral tablet, extended release: 2 tab(s) ( 60 mg ), po, qam, 0 Refill(s), Type: Maintenance  ranitidine 150 mg oral tablet: 1 tab(s) ( 150 mg ), po, daily, 0 Refill(s), Type: Maintenance  simvastatin 10 mg oral tablet: 1 tab(s) ( 10 mg ), PO, Once a day (at bedtime), # 30 tab(s), 0 Refill(s), Type: Maintenance  triamcinolone 0.1% topical cream: 1 karl, top, bid, 0 Refill(s), Type: Maintenance   Problem list:    All Problems  AAA (Abdominal Aortic Aneurysm) / ICD-9-.4 / Confirmed  Amaurosis fugax of left eye / ICD-9-.34 / Confirmed  BPH (Benign Prostatic Hypertrophy) / ICD-9-.00 / Confirmed  Osteopenia / ICD-9-.90 / Confirmed  Peptic  Disease / ICD-9-.90 / Confirmed  PMR (Polymyalgia Rheumatica) / ICD-9- / Confirmed  TMJ dysfunction / ICD-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  Upper GI endoscopy (SNOMED CT 9062020940) in 1995 at 67 Years.   Social History:  and living at home      Physical Examination   Vital Signs   12/11/2017 3:33 PM CST Temperature Tympanic 98.9 DegF    Peripheral Pulse Rate 72 bpm    Pulse Site Radial artery    Systolic Blood Pressure 132 mmHg    Diastolic Blood Pressure 88 mmHg    Mean Arterial Pressure 103 mmHg    BP Site Right arm    BP Method Manual      Measurements from flowsheet : Measurements   12/11/2017 3:33 PM CST Height Measured - Standard 68.25 in    Weight Measured - Standard 178.2 lb    BSA 1.97 m2    Body Mass Index 26.89 kg/m2      General:  Alert and oriented, No acute distress.    HENT:  No pharyngeal erythema.    Respiratory:  Lungs are clear to auscultation.    Cardiovascular:  Normal rate, Regular rhythm.    Musculoskeletal:  Normal range of motion, Normal strength, Normal gait.    Neurologic:  No focal deficits.    Psychiatric:  Appropriate mood & affect.       Impression and Plan   Diagnosis     Acute maxillary sinusitis (RTG49-LA J01.00).     Discussed NETI pot and will start augmentin and follow up if not improving.

## 2022-02-16 NOTE — PROGRESS NOTES
Patient:   ERICA MTZ SR            MRN: [id number]            FIN: [id number]               Age:   88 years     Sex:  Male     :  9/15/1928   Associated Diagnoses:   AAA (Abdominal Aortic Aneurysm); Coronary artery disease; Hypertension; PMR (Polymyalgia Rheumatica)   Author:   Leonardo Gilbert MD      Visit Information      Date of Service: 2017 03:23 pm  Performing Location: [facility]  Encounter#: [id number]      Primary Care Provider (PCP):  Leonardo Gilbert MD    NPI# [id number]      Referring Provider:  No referring provider recorded for selected visit.      Chief Complaint   2017 3:35 PM CDT    achy all over x Sat - hx of PMR              Additional Information:No additional information recorded during visit.   Chief complaint and symptoms as noted above and confirmed with patient.  Recent lab and diagnostic studies reviewed with patient      History of Present Illness   2016: Erica presents to clinic with several concerns.  He is primarily followed through the general medicine clinic at [place] by Dr. Krishna Man.  He has a prior history of suspected coronary artery disease related to a positive nuclear stress test in .  He has been treated with medical management and has never undergone diagnostic heart catheterization.  He also has a suspected history of cerebrovascular disease and is maintained on both aspirin and Plavix.  Overall he is a very intelligent man who has good insight into his underlying medical care.  His primary concern is his blood pressure lability.  He provides meticulous detail of blood pressure readings at home.  His systolic pressures in the morning and the evenings are consistently in the 150s-160s range.  He shows morning blood pressures after breakfast (between 8 and 11 AM) with systolic readings between 90s-110s.  He describes having orthostatic symptoms on a fairly predictable basis during this timeframe.  Currently he takes isosorbide mononitrate 30  mg in the morning and Toprol-XL 25 mg in the evening.  He also describes having a chest tightness when attempting to shovel snow on his driveway.  He does not feel this is angina.  He feels that symptoms are primarily brought on by the cold weather.  Denies any active chest pains at this time.  He does have nitroglycerin tablets available at home though has never felt he needed them.    11/9/2016: Returns to clinic for general follow-up.  He was seen by cardiology clinic at [place] in August of this year.  He has had his home blood pressure medication slowly advanced.  Currently taking Imdur 30 mg twice daily, Toprol-XL 75 mg in the evening and amlodipine 2.5 mg prior to bedtime.  He did complete a 24-hour amatory blood pressure monitor in August showing average systolic pressures less than 140.  Home readings typically in the high 140s.  No further presyncopal episodes.    5/25/2017: Presents to clinic with approximately 5 day history of left-sided gluteal sciatic pains extending down left leg.  Primary concern is for the past few weeks describes substantial proximal myalgias in both shoulders, through his torso and hip girdle.  He says symptoms are very similar to what he experienced when originally diagnosed with polymyalgia rheumatica approximately 10 years ago.  He recalls having very exquisite responsiveness to steroids at the time.  He recalls a fairly quick tapering off of prednisone, and was never on a maintenance phase of corticosteroids.  He did have bilateral temporal artery biopsies back in 2008 which were normal.  Denies any current headache or visual changes.         Review of Systems   Constitutional:  No fever, No chills.    Eye:  Negative except as documented in history of present illness.    Ear/Nose/Mouth/Throat:  Negative except as documented in history of present illness.    Respiratory:  No shortness of breath.    Cardiovascular:  Chest pain, No palpitations, No peripheral edema, No syncope.     Gastrointestinal:  No nausea, No vomiting, No abdominal pain.    Genitourinary:  No dysuria, No hematuria.    Hematology/Lymphatics:  Negative except as documented in history of present illness.    Endocrine:  No excessive thirst, No polyuria.    Immunologic:  No recurrent fevers.    Musculoskeletal:  Joint pain, Muscle pain, Decreased range of motion.         Back pain: On the left side.    Neurologic:  Alert and oriented X4, No numbness, No tingling, No headache.       Health Status   Allergies:    Allergic Reactions (Selected)  Severity Not Documented  Aspirin (No reactions were documented)   Medications:  (Selected)   Prescriptions  Prescribed  Metoprolol Succinate ER 50 mg oral tablet, extended release: See Instructions, Instructions: TAKE ONE TABLET BY MOUTH EVERY DAY - ALONG WITH A 25MG TABLET, # 30 tab(s), 2 Refill(s), Type: Maintenance, Pharmacy: [facility]  amLODIPine 5 mg oral tablet: See Instructions, Instructions: TAKE ONE TABLET BY MOUTH EVERY DAY, # 30 unknown unit, 2 Refill(s), Type: Soft Stop, Pharmacy: [facility]  predniSONE 20 mg oral tablet: See Instructions, Instructions: take 2 tabs daily for 3 days, then 1 tab daily for 1 week, # 13 tab(s), 0 Refill(s), Type: Maintenance, Pharmacy: [facility], take 2 tabs daily for 3 days, then 1 tab daily for 1 week  Documented Medications  Documented  Flomax 0.4 mg oral capsule: 1 cap(s), PO, Daily, # 30 cap(s), 0  Nitrostat 0.4 mg sublingual tablet: 1 tab(s) ( 0.4 mg ), SL, prn, PRN: for chest pain, Type: Maintenance  Omega-3 oral capsule: 0 Refill(s), Type: Maintenance  Plavix 75 mg oral tablet: 1 tab(s), PO, Daily, # 30 tab(s), 0  Tums: See Instructions, Instructions: 250 mg chewed QOD, 0 Refill(s), Type: Maintenance  Vitamin D3 1000 intl units oral tablet: 1 tab(s) ( 1,000 International Unit ), po, bid, 0 Refill(s), Type: Maintenance  aspirin 81 mg oral tablet: 1 tab(s) ( 81 mg  ), PO, Daily, 0 Refill(s), Type: Maintenance  clobetasol 0.05% topical ointment: 1 karl, top, bid, 0 Refill(s), Type: Maintenance  finasteride 5 mg oral tablet: 1 tab(s), PO, Daily, # 30 tab(s), 0 Refill(s)  hydrocortisone 2.5% topical cream: 1 karl, TOP, bid, # 20 g, 0 Refill(s), Type: Maintenance  isosorbide mononitrate 30 mg oral tablet, extended release: 2 tab(s) ( 60 mg ), po, qam, 0 Refill(s), Type: Maintenance  metoprolol succinate 25 mg oral tablet, extended release: 1 tab(s) ( 25 mg ), po, daily, 0 Refill(s), Type: Maintenance  ranitidine 150 mg oral tablet: 1 tab(s) ( 150 mg ), po, daily, 0 Refill(s), Type: Maintenance  simvastatin 10 mg oral tablet: 1 tab(s) ( 10 mg ), PO, Once a day (at bedtime), # 30 tab(s), 0 Refill(s), Type: Maintenance  triamcinolone 0.1% topical cream: 1 karl, top, bid, 0 Refill(s), Type: Maintenance   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  Amaurosis fugax of left eye (362.34): Onset on 2002 at 74 years.  Osteopenia (733.90): Onset in  at 73 years.  PMR (Polymyalgia Rheumatica) (725): Onset in  at 69 years.  BPH (Benign Prostatic Hypertrophy) (600.00)  AAA (Abdominal Aortic Aneurysm) (441.4)   Family History:    CA - Lung cancer  Father ()  Bladder cancer  Mother ()     Procedure history:    Cholecystectomy (52665274) on 2007 at 78 Years.  Upper GI endoscopy (9329589316) in  at 67 Years.   Social History:        Tobacco Assessment:  Denies Tobacco Use            Past                     Comments:                      05/06/2011 - PollyGreta cordova                     Smoked for a brief period of time. Quit back in the 1950s.      Employment and Education Assessment            Retired      Home and Environment Assessment            Marital status: .        Physical Examination   vital signs stable, as noted above   Vital Signs   5/25/2017 3:35 PM CDT Temperature Tympanic 97 DegF  LOW    Peripheral Pulse Rate 72 bpm    Systolic Blood Pressure 152 mmHg  HI    Diastolic Blood Pressure 72 mmHg    Mean Arterial Pressure 99 mmHg      Measurements from flowsheet : Measurements   5/25/2017 3:35 PM CDT    Weight Measured - Standard                175.2 lb     General:  Alert and oriented, No acute distress.    Eye:  Pupils are equal, round and reactive to light, Extraocular movements are intact, Normal conjunctiva.    HENT:  Normocephalic, Tympanic membranes are clear, Oral mucosa is moist.    Neck:  Supple, No carotid bruit, No jugular venous distention, No lymphadenopathy.    Respiratory:  Lungs are clear to auscultation, Respirations are non-labored.    Cardiovascular:  Normal rate, Regular rhythm, No murmur, No edema.    Gastrointestinal:  Soft.    Musculoskeletal:  Normal range of motion, Normal strength, No tenderness.    Neurologic:  Alert, Oriented, Normal motor function, No focal deficits, Cranial Nerves II-XII are grossly intact.    Cognition and Speech:  Oriented, Speech clear and coherent.    Psychiatric:  Appropriate mood & affect.       Impression and Plan   Diagnosis     AAA (Abdominal Aortic Aneurysm) (RQK95-DS I71.4).     Coronary artery disease (TVT15-OT I25.10).     Hypertension (GOW38-DN I10).     PMR (Polymyalgia Rheumatica) (NUX54-AH M35.3).         .) polymyalgia - suspicious for PMR-like symptoms, originally diagnosed in '08 through [place]; treated with short-term prednisone taper; no recurrence of disease since   - no GCA  features   - check CRP, sed rate   - will start empirically on prednisone 40mg daily for 3 days, then 20mg daily for 1 week after     - if remaining on prednisone for any duration of time, would plan for introduction of bisphosphonate    plan as previously outlined:     .) Hypertension - recent ambulatory 24 hr bp monitor (8/2016); mean SBP in 130s  current antihypertensive regimen: Toprol XL 75mg (PM), ISMN 30mg BID, amlodipine 2.5mg qPM  regimen changes: increase amlodipine to 5mg   intolerance:  future titration/work-up plan:    -    .) CAD, suspected based on abnormal nuclear stress testing in '12 ([place]); no h/o angiography   - conservative mgmt via medical therapy given age            - maintained on dual anti-platelet therapy    .) infrarenal AAA   - last U/S measurement of 4.8cm - stable    RTC in 1 week for reassessment of symptoms      Professional Services   Counseling Summary:  This was a 15 minute visit with greater than 50% of that time spent counseling the patient.

## 2022-04-08 PROBLEM — M85.80 OSTEOPENIA: Status: ACTIVE | Noted: 2022-04-08

## 2022-04-08 PROBLEM — K52.9 GASTROENTEROPATHY: Status: ACTIVE | Noted: 2022-04-08

## 2022-04-08 PROBLEM — I10 ESSENTIAL HYPERTENSION: Status: ACTIVE | Noted: 2022-04-08

## 2022-04-08 PROBLEM — I71.40 ABDOMINAL AORTIC ANEURYSM (AAA) (H): Status: ACTIVE | Noted: 2022-04-08

## 2022-04-08 PROBLEM — N40.0 BENIGN PROSTATIC HYPERPLASIA: Status: ACTIVE | Noted: 2022-04-08

## 2022-04-12 ENCOUNTER — OFFICE VISIT (OUTPATIENT)
Dept: FAMILY MEDICINE | Facility: CLINIC | Age: 87
End: 2022-04-12
Payer: MEDICARE

## 2022-04-12 VITALS
WEIGHT: 181.1 LBS | OXYGEN SATURATION: 99 % | BODY MASS INDEX: 28.43 KG/M2 | TEMPERATURE: 97.5 F | SYSTOLIC BLOOD PRESSURE: 153 MMHG | HEIGHT: 67 IN | HEART RATE: 67 BPM | DIASTOLIC BLOOD PRESSURE: 81 MMHG

## 2022-04-12 DIAGNOSIS — J30.2 SEASONAL ALLERGIC RHINITIS, UNSPECIFIED TRIGGER: Primary | ICD-10-CM

## 2022-04-12 PROCEDURE — 99213 OFFICE O/P EST LOW 20 MIN: CPT | Performed by: INTERNAL MEDICINE

## 2022-04-12 RX ORDER — TAMSULOSIN HYDROCHLORIDE 0.4 MG/1
0.4 CAPSULE ORAL DAILY
COMMUNITY

## 2022-04-12 RX ORDER — SIMVASTATIN 10 MG
10 TABLET ORAL AT BEDTIME
COMMUNITY
End: 2022-09-22

## 2022-04-12 RX ORDER — TRIAMCINOLONE ACETONIDE 1 MG/ML
LOTION TOPICAL 3 TIMES DAILY PRN
COMMUNITY

## 2022-04-12 RX ORDER — NITROGLYCERIN 0.4 MG/1
0.4 TABLET SUBLINGUAL EVERY 5 MIN PRN
COMMUNITY
End: 2022-09-22

## 2022-04-12 RX ORDER — FLUOCINONIDE 0.5 MG/G
CREAM TOPICAL 2 TIMES DAILY PRN
COMMUNITY

## 2022-04-12 RX ORDER — ASPIRIN 81 MG/1
81 TABLET, CHEWABLE ORAL
COMMUNITY

## 2022-04-12 RX ORDER — FAMOTIDINE 20 MG/1
20 TABLET, FILM COATED ORAL DAILY
COMMUNITY
End: 2022-09-22

## 2022-04-12 RX ORDER — METOPROLOL SUCCINATE 50 MG/1
50 TABLET, EXTENDED RELEASE ORAL DAILY
COMMUNITY

## 2022-04-12 RX ORDER — EMOLLIENT BASE
CREAM (GRAM) TOPICAL PRN
COMMUNITY

## 2022-04-12 RX ORDER — FINASTERIDE 5 MG/1
5 TABLET, FILM COATED ORAL
COMMUNITY

## 2022-04-12 RX ORDER — ISOSORBIDE MONONITRATE 30 MG/1
30 TABLET, EXTENDED RELEASE ORAL
COMMUNITY
End: 2022-11-21

## 2022-04-12 RX ORDER — AMLODIPINE BESYLATE 2.5 MG/1
2.5 TABLET ORAL DAILY
COMMUNITY
End: 2022-09-22

## 2022-04-12 RX ORDER — METOPROLOL SUCCINATE 25 MG/1
25 TABLET, EXTENDED RELEASE ORAL DAILY
COMMUNITY

## 2022-04-12 NOTE — PATIENT INSTRUCTIONS
Symptom description would be very typical for underlying seasonal allergies or also could represent a lingering upper respiratory tract infection.  If an upper respiratory tract infection, I do not think any further therapies or action will change the time course.  However of allergies, there may be medicines to your benefit.    Consider rechallenging on Zyrtec or trying an alternative nondrowsy antihistamine, like loratadine or fexofenadine.  Alternatively would recommend trialing an intranasal steroid like Flonase or Nasacort.      If nasal congestion and drainage resolves and only remaining symptoms is eye tearing, then may need to consider evaluation by eye specialist to make sure no issues with the tear duct.  If having more localized eye symptoms and redness, would consider over-the-counter Pataday and/or Systane topical lubricating eyedrops.

## 2022-04-12 NOTE — PROGRESS NOTES
"  Assessment & Plan     .)  2-week history of persistent unilateral right-sided nasal congestion and eye tearing.  Denies any fever, postnasal symptoms, cough.  Historically has not had issues with seasonal allergies.  Discussed that symptom features would be typical for allergic rhinitis or very well could be a mild self-limiting upper respiratory tract infection.  Given duration of symptoms I think further diagnostic testing for viral origin would not be helpful.  Discussed alternative allergic rhinitis therapies including retrialing of cetirizine versus trial of another nondrowsy antihistamine versus intranasal steroid.  Also offered advice on eyedrops including Pataday versus Systane  6}     BMI:   Estimated body mass index is 28.79 kg/m  as calculated from the following:    Height as of this encounter: 1.689 m (5' 6.5\").    Weight as of this encounter: 82.1 kg (181 lb 1.6 oz).         No follow-ups on file.    Augustine Gilbert MD  [facility]    Maren Maldonado is a 89 year old who presents for the following health issues.     History of Present Illness       Reason for visit:  Symptoms given  Symptom onset:  1-2 weeks ago  Symptoms include:  Nose discharge right side only and tears.n  Symptom intensity:  Moderate  Symptom progression:  Staying the same  Had these symptoms before:  No  What makes it worse:  No  What makes it better:  Hope exercises with enough effort to increase his heart rate 9 or less minutes per day.  He exercises with enough effort to increase his heart rate 3 or less days per week.   He is taking medications regularly.       Review of Systems   A 10 point complete review of systems was inquired and negative except for the pertinent following findings        Objective    BP (!) 153/81   Pulse 67   Temp 97.5  F (36.4  C)   Ht 1.689 m (5' 6.5\")   Wt 82.1 kg (181 lb 1.6 oz)   SpO2 99%   BMI 28.79 kg/m    Body mass index is 28.79 kg/m .  Physical Exam "   GENERAL: healthy, alert and no distress  NECK: no adenopathy, no asymmetry, masses, or scars and thyroid normal to palpation  RESP: lungs clear to auscultation - no rales, rhonchi or wheezes  CV: regular rate and rhythm, normal S1 S2, no S3 or S4, no murmur, click or rub, no peripheral edema and peripheral pulses strong  ABDOMEN: soft  MS: no gross musculoskeletal defects noted, no edema

## 2022-09-22 ENCOUNTER — OFFICE VISIT (OUTPATIENT)
Dept: FAMILY MEDICINE | Facility: CLINIC | Age: 87
End: 2022-09-22
Payer: MEDICARE

## 2022-09-22 VITALS
TEMPERATURE: 98.4 F | HEART RATE: 72 BPM | BODY MASS INDEX: 28.25 KG/M2 | SYSTOLIC BLOOD PRESSURE: 135 MMHG | WEIGHT: 177.7 LBS | DIASTOLIC BLOOD PRESSURE: 80 MMHG

## 2022-09-22 DIAGNOSIS — H04.203 TEARING EYES: Primary | ICD-10-CM

## 2022-09-22 PROBLEM — R05.9 COUGH: Status: ACTIVE | Noted: 2019-10-09

## 2022-09-22 PROBLEM — H61.90 LESION OF EXTERNAL EAR: Status: ACTIVE | Noted: 2019-10-09

## 2022-09-22 PROBLEM — R19.7 DIARRHEA: Status: ACTIVE | Noted: 2020-08-24

## 2022-09-22 PROBLEM — H04.209 EPIPHORA: Status: ACTIVE | Noted: 2022-06-15

## 2022-09-22 PROBLEM — L57.0 SENILE HYPERKERATOSIS: Status: ACTIVE | Noted: 2020-08-24

## 2022-09-22 PROBLEM — K21.9 GASTROESOPHAGEAL REFLUX DISEASE: Status: ACTIVE | Noted: 2018-05-23

## 2022-09-22 PROCEDURE — 99212 OFFICE O/P EST SF 10 MIN: CPT | Performed by: FAMILY MEDICINE

## 2022-09-22 RX ORDER — AZELASTINE HYDROCHLORIDE 0.5 MG/ML
1 SOLUTION/ DROPS OPHTHALMIC 2 TIMES DAILY
Qty: 6 ML | Refills: 1 | Status: SHIPPED | OUTPATIENT
Start: 2022-09-22 | End: 2024-01-16

## 2022-09-22 RX ORDER — FAMOTIDINE 20 MG/1
20 TABLET, FILM COATED ORAL
COMMUNITY
Start: 2022-06-15

## 2022-09-22 RX ORDER — SIMVASTATIN 10 MG
1 TABLET ORAL
COMMUNITY
Start: 2022-05-26

## 2022-09-22 RX ORDER — NITROGLYCERIN 0.4 MG/1
0.4 TABLET SUBLINGUAL
COMMUNITY

## 2022-09-22 RX ORDER — FINASTERIDE 5 MG/1
5 TABLET, FILM COATED ORAL
COMMUNITY
Start: 2022-06-15 | End: 2022-09-22

## 2022-09-22 RX ORDER — AMLODIPINE BESYLATE 2.5 MG/1
1 TABLET ORAL DAILY
COMMUNITY
Start: 2022-08-23

## 2022-09-22 NOTE — PROGRESS NOTES
"  Assessment & Plan     Tearing eyes  Not improving  - azelastine (OPTIVAR) 0.05 % ophthalmic solution; Place 1 drop into both eyes 2 times daily    BMI:   Estimated body mass index is 28.25 kg/m  as calculated from the following:    Height as of 4/12/22: 1.689 m (5' 6.5\").    Weight as of this encounter: 80.6 kg (177 lb 11.2 oz).     Return if symptoms worsen or fail to improve.    Fercho Harp MD  [facility]    Maren Maldonado is a 89 year old accompanied by his self, presenting for the following health issues:  Eye Problem (Tearing in eyes )      History of Present Illness       Reason for visit:  Tearing in eyes  Symptom onset:  More than a month  Symptoms include:  Excessive tearing in eyes  Symptom intensity:  Moderate  Symptom progression:  Worsening  Had these symptoms before:  Yes  What makes it worse:  No  What makes it better:  No    He eats 0-1 servings of fruits and vegetables daily.He consumes 0 sweetened beverage(s) daily.He exercises with enough effort to increase his heart rate 9 or less minutes per day.  He exercises with enough effort to increase his heart rate 3 or less days per week.   He is taking medications regularly.       Eye(s) Problem  Onset/Duration: June  Description:   Location: Both eyes  Pain: No  Redness: No  Accompanying Signs & Symptoms:  Discharge/mattering: YES  Swelling: No  Visual changes: No  Fever: No  Nasal Congestion: Yes  Bothered by bright lights: No  History:  Trauma: No  Foreign body exposure: No  Wearing contacts: No  Precipitating or alleviating factors: None  Therapies tried and outcome: Multiple OTC      Review of Systems   Constitutional, HEENT, cardiovascular, pulmonary, gi and gu systems are negative, except as otherwise noted.      Objective    BP (!) 146/80 (BP Location: Right arm)   Pulse 72   Temp 98.4  F (36.9  C)   Wt 80.6 kg (177 lb 11.2 oz)   BMI 28.25 kg/m    Body mass index is 28.25 kg/m .  Physical Exam "   GENERAL: healthy, alert and no distress  NECK: no adenopathy, no asymmetry, masses, or scars and thyroid normal to palpation  RESP: lungs clear to auscultation - no rales, rhonchi or wheezes  CV: regular rate and rhythm, normal S1 S2, no S3 or S4, no murmur, click or rub, no peripheral edema and peripheral pulses strong  ABDOMEN: soft, nontender, no hepatosplenomegaly, no masses and bowel sounds normal  MS: no gross musculoskeletal defects noted, no edema

## 2022-10-20 ENCOUNTER — ALLIED HEALTH/NURSE VISIT (OUTPATIENT)
Dept: FAMILY MEDICINE | Facility: CLINIC | Age: 87
End: 2022-10-20
Payer: MEDICARE

## 2022-10-20 DIAGNOSIS — Z23 NEED FOR VACCINATION: Primary | ICD-10-CM

## 2022-10-20 PROCEDURE — G0008 ADMIN INFLUENZA VIRUS VAC: HCPCS

## 2022-10-20 PROCEDURE — 99207 PR NO CHARGE NURSE ONLY: CPT

## 2022-10-20 PROCEDURE — 90662 IIV NO PRSV INCREASED AG IM: CPT

## 2022-11-21 ENCOUNTER — OFFICE VISIT (OUTPATIENT)
Dept: PODIATRY | Facility: CLINIC | Age: 87
End: 2022-11-21
Payer: MEDICARE

## 2022-11-21 VITALS
DIASTOLIC BLOOD PRESSURE: 76 MMHG | BODY MASS INDEX: 28.22 KG/M2 | SYSTOLIC BLOOD PRESSURE: 126 MMHG | WEIGHT: 179.8 LBS | HEIGHT: 67 IN | HEART RATE: 74 BPM | OXYGEN SATURATION: 96 %

## 2022-11-21 DIAGNOSIS — B35.1 ONYCHOMYCOSIS: Primary | ICD-10-CM

## 2022-11-21 DIAGNOSIS — M79.674 PAIN OF GREAT TOE, RIGHT: ICD-10-CM

## 2022-11-21 PROCEDURE — 11720 DEBRIDE NAIL 1-5: CPT | Performed by: PODIATRIST

## 2022-11-21 PROCEDURE — 99203 OFFICE O/P NEW LOW 30 MIN: CPT | Mod: 25 | Performed by: PODIATRIST

## 2022-11-21 RX ORDER — DUTASTERIDE 0.5 MG/1
0.5 CAPSULE, LIQUID FILLED ORAL DAILY
COMMUNITY
Start: 2022-10-05 | End: 2024-01-16

## 2022-11-21 NOTE — PROGRESS NOTES
Patient complains of thick nail on right first toe.  Has had this for years.  Recently painful.  It is slowly getting worse.  Has had pain in the past which is aggravated by activity and relieved by rest.  Describes as a burning pain.  Hard to walk in shoes now because of the pain. Tried over the counter medications without success.  Does not like the look of the nail and is wondering about options.    ROS:   See above         Allergies   Allergen Reactions     Aspirin      Hurts stomach lining     Augmentin      Other reaction(s): diarrhea     Nsaids      Other reaction(s): *Unknown       Current Outpatient Medications   Medication Sig Dispense Refill     amLODIPine (NORVASC) 2.5 MG tablet Take 1 tablet by mouth daily       aspirin (ASA) 81 MG chewable tablet Take 81 mg by mouth       azelastine (OPTIVAR) 0.05 % ophthalmic solution Place 1 drop into both eyes 2 times daily 6 mL 1     dutasteride (AVODART) 0.5 MG capsule Take 0.5 mg by mouth daily       emollient (VANICREAM) external cream Apply topically as needed for other       famotidine (PEPCID) 20 MG tablet Take 20 mg by mouth       fluocinonide (LIDEX) 0.05 % external cream Apply topically 2 times daily as needed       metoprolol succinate ER (TOPROL XL) 50 MG 24 hr tablet Take 50 mg by mouth in the morning.       metoprolol succinate ER (TOPROL-XL) 25 MG 24 hr tablet Take 25 mg by mouth in the morning.       nitroGLYcerin (NITROSTAT) 0.4 MG sublingual tablet Place 0.4 mg under the tongue       simvastatin (ZOCOR) 10 MG tablet Take 1 tablet by mouth       tamsulosin (FLOMAX) 0.4 MG capsule Take 0.4 mg by mouth in the morning.       triamcinolone (KENALOG) 0.1 % external lotion Apply topically 3 times daily as needed for irritation       UNABLE TO FIND MEDICATION NAME: docosahhexanoic acid(Algal omega 3 dha,       camphor-menthol (DERMASARRA) 0.5-0.5 % external lotion Apply topically every 6 hours as needed for skin care       finasteride (PROSCAR) 5 MG tablet  "Take 5 mg by mouth         Patient Active Problem List   Diagnosis     Abdominal aortic aneurysm (AAA)     Amaurosis fugax of left eye     Benign prostatic hyperplasia     Gastroenteropathy     Osteopenia     Essential hypertension     Arteriosclerosis of coronary artery     Cerebrovascular disease     Chest pain, unspecified     Cough     Diarrhea     Epiphora     Gastroesophageal reflux disease     Hx of transient ischemic attack (TIA)     Hyperlipidemia     Lesion of external ear     Polymyalgia rheumatica (H)     Senile hyperkeratosis     Stable angina pectoris (H)       Past Medical History:   Diagnosis Date     Abdominal aortic aneurysm (AAA) (H) 2022     Essential hypertension 2022       Past Surgical History:   Procedure Laterality Date     CHOLECYSTECTOMY       EGD         Family History   Problem Relation Age of Onset     Bladder Cancer Mother          at age: Unknown     Lung Cancer Father          at age: Unknown       Social History     Tobacco Use     Smoking status: Former     Smokeless tobacco: Former   Substance Use Topics     Alcohol use: Not on file         Exam:    Vitals: /76 (BP Location: Right arm)   Pulse 74   Ht 1.689 m (5' 6.5\")   Wt 81.6 kg (179 lb 12.8 oz)   SpO2 96%   BMI 28.59 kg/m    BMI: Body mass index is 28.59 kg/m .  Height: 5' 6.5\"    Constitutional/ general:  Pt is in no apparent distress, appears well-nourished.  Cooperative with history and physical exam.     Psych:  The patient answered questions appropriately.  Normal affect.  Seems to have reasonable expectations, in terms of treatment.     Lungs:  Non labored breathing, non labored speech. No cough.  No audible wheezing. Even, quiet breathing.       Vascular: Pedal pulses weak    Neuro:  Alert and oriented x 3. Coordinated gait.  Light touch sensation is intact    Derm: Thin shiny    Musculoskeletal:    Lower extremity muscle strength is normal.  Patient is ambulatory without an " assistive device or brace.  No gross deformities.  Normal arch.        right first nail thickened, elongated, discolored, with subungual debris.  No erythema, edema, drainage, pain on palpation.  No signs of subungual masses or exostosis and nailbed healthy.    A/P  Onychomycosis          pain    Discussed all options with patient.  Mycotic nail manually debrided with a .      Patient will keep this debrided/filed down.  Discussed with patient that medicare will not pay for this service in the future and that I do not do this in my practice unless patient at significant risk of limb loss.  Will refer to foot care nurse.  RETURN TO CLINIC prn.    Giorgio Barnes DPM, FACFAS

## 2022-11-21 NOTE — LETTER
11/21/2022         RE: Erica RAMIREZ Dana Brand.  [address] [address]        Dear Colleague,    Thank you for referring your patient, Erica Cortez ., to the [facility]. Please see a copy of my visit note below.    Patient complains of thick nail on right first toe.  Has had this for years.  Recently painful.  It is slowly getting worse.  Has had pain in the past which is aggravated by activity and relieved by rest.  Describes as a burning pain.  Hard to walk in shoes now because of the pain. Tried over the counter medications without success.  Does not like the look of the nail and is wondering about options.    ROS:   See above         Allergies   Allergen Reactions     Aspirin      Hurts stomach lining     Augmentin      Other reaction(s): diarrhea     Nsaids      Other reaction(s): *Unknown       Current Outpatient Medications   Medication Sig Dispense Refill     amLODIPine (NORVASC) 2.5 MG tablet Take 1 tablet by mouth daily       aspirin (ASA) 81 MG chewable tablet Take 81 mg by mouth       azelastine (OPTIVAR) 0.05 % ophthalmic solution Place 1 drop into both eyes 2 times daily 6 mL 1     dutasteride (AVODART) 0.5 MG capsule Take 0.5 mg by mouth daily       emollient (VANICREAM) external cream Apply topically as needed for other       famotidine (PEPCID) 20 MG tablet Take 20 mg by mouth       fluocinonide (LIDEX) 0.05 % external cream Apply topically 2 times daily as needed       metoprolol succinate ER (TOPROL XL) 50 MG 24 hr tablet Take 50 mg by mouth in the morning.       metoprolol succinate ER (TOPROL-XL) 25 MG 24 hr tablet Take 25 mg by mouth in the morning.       nitroGLYcerin (NITROSTAT) 0.4 MG sublingual tablet Place 0.4 mg under the tongue       simvastatin (ZOCOR) 10 MG tablet Take 1 tablet by mouth       tamsulosin (FLOMAX) 0.4 MG capsule Take 0.4 mg by mouth in the morning.       triamcinolone (KENALOG) 0.1 % external lotion Apply  "topically 3 times daily as needed for irritation       UNABLE TO FIND MEDICATION NAME: docosahhexanoic acid(Algal omega 3 dha,       camphor-menthol (DERMASARRA) 0.5-0.5 % external lotion Apply topically every 6 hours as needed for skin care       finasteride (PROSCAR) 5 MG tablet Take 5 mg by mouth         Patient Active Problem List   Diagnosis     Abdominal aortic aneurysm (AAA)     Amaurosis fugax of left eye     Benign prostatic hyperplasia     Gastroenteropathy     Osteopenia     Essential hypertension     Arteriosclerosis of coronary artery     Cerebrovascular disease     Chest pain, unspecified     Cough     Diarrhea     Epiphora     Gastroesophageal reflux disease     Hx of transient ischemic attack (TIA)     Hyperlipidemia     Lesion of external ear     Polymyalgia rheumatica (H)     Senile hyperkeratosis     Stable angina pectoris (H)       Past Medical History:   Diagnosis Date     Abdominal aortic aneurysm (AAA) (H) 2022     Essential hypertension 2022       Past Surgical History:   Procedure Laterality Date     CHOLECYSTECTOMY       EGD         Family History   Problem Relation Age of Onset     Bladder Cancer Mother          at age: Unknown     Lung Cancer Father          at age: Unknown       Social History     Tobacco Use     Smoking status: Former     Smokeless tobacco: Former   Substance Use Topics     Alcohol use: Not on file         Exam:    Vitals: /76 (BP Location: Right arm)   Pulse 74   Ht 1.689 m (5' 6.5\")   Wt 81.6 kg (179 lb 12.8 oz)   SpO2 96%   BMI 28.59 kg/m    BMI: Body mass index is 28.59 kg/m .  Height: 5' 6.5\"    Constitutional/ general:  Pt is in no apparent distress, appears well-nourished.  Cooperative with history and physical exam.     Psych:  The patient answered questions appropriately.  Normal affect.  Seems to have reasonable expectations, in terms of treatment.     Lungs:  Non labored breathing, non labored speech. No cough.  No " audible wheezing. Even, quiet breathing.       Vascular: Pedal pulses weak    Neuro:  Alert and oriented x 3. Coordinated gait.  Light touch sensation is intact    Derm: Thin shiny    Musculoskeletal:    Lower extremity muscle strength is normal.  Patient is ambulatory without an assistive device or brace.  No gross deformities.  Normal arch.        right first nail thickened, elongated, discolored, with subungual debris.  No erythema, edema, drainage, pain on palpation.  No signs of subungual masses or exostosis and nailbed healthy.    A/P  Onychomycosis          pain    Discussed all options with patient.  Mycotic nail manually debrided with a .      Patient will keep this debrided/filed down.  Discussed with patient that medicare will not pay for this service in the future and that I do not do this in my practice unless patient at significant risk of limb loss.  Will refer to foot care nurse.  RETURN TO CLINIC prn.    Giorgio Barnes DPM, FACFAS          Again, thank you for allowing me to participate in the care of your patient.        Sincerely,        Giorgio Barnes DPM

## 2023-02-12 ENCOUNTER — HEALTH MAINTENANCE LETTER (OUTPATIENT)
Age: 88
End: 2023-02-12

## 2023-07-25 ENCOUNTER — LAB (OUTPATIENT)
Dept: LAB | Facility: CLINIC | Age: 88
End: 2023-07-25
Payer: MEDICARE

## 2023-07-25 ENCOUNTER — TELEPHONE (OUTPATIENT)
Dept: FAMILY MEDICINE | Facility: CLINIC | Age: 88
End: 2023-07-25

## 2023-07-25 DIAGNOSIS — N40.0 BENIGN PROSTATE HYPERPLASIA: ICD-10-CM

## 2023-07-25 DIAGNOSIS — N18.30 CHRONIC KIDNEY DISEASE, STAGE III (MODERATE) (H): ICD-10-CM

## 2023-07-25 DIAGNOSIS — N18.30 CHRONIC KIDNEY DISEASE, STAGE III (MODERATE) (H): Primary | ICD-10-CM

## 2023-07-25 LAB
ALBUMIN UR-MCNC: NEGATIVE MG/DL
APPEARANCE UR: CLEAR
BILIRUB UR QL STRIP: NEGATIVE
COLOR UR AUTO: YELLOW
GLUCOSE UR STRIP-MCNC: NEGATIVE MG/DL
HGB UR QL STRIP: NEGATIVE
KETONES UR STRIP-MCNC: ABNORMAL MG/DL
LEUKOCYTE ESTERASE UR QL STRIP: NEGATIVE
NITRATE UR QL: NEGATIVE
PH UR STRIP: 5.5 [PH] (ref 5–7)
SP GR UR STRIP: 1.01 (ref 1–1.03)
UROBILINOGEN UR STRIP-ACNC: 0.2 E.U./DL

## 2023-07-25 PROCEDURE — 81003 URINALYSIS AUTO W/O SCOPE: CPT | Mod: QW

## 2023-07-25 NOTE — TELEPHONE ENCOUNTER
Please call pt. He states the clinic called him today. He has lab appt today but not sure if he should come in.

## 2023-07-25 NOTE — TELEPHONE ENCOUNTER
Patient has appt today for lab at 10.  Do we have faxed lab order?  Faxed on 7/19.    Barbara Figueredo on 7/25/2023 at 8:31 AM

## 2023-08-02 ENCOUNTER — LAB (OUTPATIENT)
Dept: LAB | Facility: CLINIC | Age: 88
End: 2023-08-02
Payer: MEDICARE

## 2023-08-02 DIAGNOSIS — N18.30 CHRONIC KIDNEY DISEASE, STAGE III (MODERATE) (H): ICD-10-CM

## 2023-08-02 DIAGNOSIS — N18.30 CHRONIC KIDNEY DISEASE, STAGE III (MODERATE) (H): Primary | ICD-10-CM

## 2023-08-02 LAB
FOLATE SERPL-MCNC: 9.9 NG/ML (ref 4.6–34.8)
IRON BINDING CAPACITY (ROCHE): 256 UG/DL (ref 240–430)
IRON SATN MFR SERPL: 24 % (ref 15–46)
IRON SERPL-MCNC: 62 UG/DL (ref 61–157)
VIT B12 SERPL-MCNC: 680 PG/ML (ref 232–1245)

## 2023-08-02 PROCEDURE — 36415 COLL VENOUS BLD VENIPUNCTURE: CPT

## 2023-08-02 PROCEDURE — 83540 ASSAY OF IRON: CPT

## 2023-08-02 PROCEDURE — 82607 VITAMIN B-12: CPT

## 2023-08-02 PROCEDURE — 83550 IRON BINDING TEST: CPT

## 2023-08-02 PROCEDURE — 82746 ASSAY OF FOLIC ACID SERUM: CPT

## 2023-11-16 ENCOUNTER — ALLIED HEALTH/NURSE VISIT (OUTPATIENT)
Dept: FAMILY MEDICINE | Facility: CLINIC | Age: 88
End: 2023-11-16
Payer: MEDICARE

## 2023-11-16 DIAGNOSIS — Z23 NEED FOR VACCINATION: Primary | ICD-10-CM

## 2023-11-16 PROCEDURE — 90480 ADMN SARSCOV2 VAC 1/ONLY CMP: CPT

## 2023-11-16 PROCEDURE — 99207 PR NO CHARGE NURSE ONLY: CPT

## 2023-11-16 PROCEDURE — 91320 SARSCV2 VAC 30MCG TRS-SUC IM: CPT

## 2023-11-16 NOTE — PROGRESS NOTES
Prior to immunization administration, verified patients identity using patient s name and date of birth. Please see Immunization Activity for additional information.     Screening Questionnaire for Adult Immunization    Are you sick today?   No   Do you have allergies to medications, food, a vaccine component or latex?   No   Have you ever had a serious reaction after receiving a vaccination?   No   Do you have a long-term health problem with heart, lung, kidney, or metabolic disease (e.g., diabetes), asthma, a blood disorder, no spleen, complement component deficiency, a cochlear implant, or a spinal fluid leak?  Are you on long-term aspirin therapy?      Do you have cancer, leukemia, HIV/AIDS, or any other immune system problem?   No   Do you have a parent, brother, or sister with an immune system problem?   No   In the past 3 months, have you taken medications that affect  your immune system, such as prednisone, other steroids, or anticancer drugs; drugs for the treatment of rheumatoid arthritis, Crohn s disease, or psoriasis; or have you had radiation treatments?   No   Have you had a seizure, or a brain or other nervous system problem?   No   During the past year, have you received a transfusion of blood or blood    products, or been given immune (gamma) globulin or antiviral drug?   No   For women: Are you pregnant or is there a chance you could become       pregnant during the next month?   No   Have you received any vaccinations in the past 4 weeks?   No     Immunization questionnaire answers were all negative.    I have reviewed the following standing orders:   This patient is due and qualifies for the Covid-19 vaccine.     Click here for COVID-19 Standing Order    I have reviewed the vaccines inclusion and exclusion criteria; No concerns regarding eligibility.     Patient instructed to remain in clinic for 15 minutes afterwards, and to report any adverse reactions.     Screening performed by Alba SPARROW  STEPHANIE Reagan on 11/16/2023 at 11:17 AM.     1 pair

## 2023-11-27 ENCOUNTER — ALLIED HEALTH/NURSE VISIT (OUTPATIENT)
Dept: FAMILY MEDICINE | Facility: CLINIC | Age: 88
End: 2023-11-27
Payer: MEDICARE

## 2023-11-27 DIAGNOSIS — Z23 NEED FOR VACCINATION: Primary | ICD-10-CM

## 2023-11-27 PROCEDURE — 90662 IIV NO PRSV INCREASED AG IM: CPT

## 2023-11-27 PROCEDURE — G0008 ADMIN INFLUENZA VIRUS VAC: HCPCS

## 2023-11-27 PROCEDURE — 99207 PR NO CHARGE NURSE ONLY: CPT

## 2023-11-27 NOTE — PROGRESS NOTES
Prior to immunization administration, verified patients identity using patient s name and date of birth. Please see Immunization Activity for additional information.     Screening Questionnaire for Adult Immunization    Are you sick today?   No   Do you have allergies to medications, food, a vaccine component or latex?   No   Have you ever had a serious reaction after receiving a vaccination?   No   Do you have a long-term health problem with heart, lung, kidney, or metabolic disease (e.g., diabetes), asthma, a blood disorder, no spleen, complement component deficiency, a cochlear implant, or a spinal fluid leak?  Are you on long-term aspirin therapy?   No   Do you have cancer, leukemia, HIV/AIDS, or any other immune system problem?   No   Do you have a parent, brother, or sister with an immune system problem?   No   In the past 3 months, have you taken medications that affect  your immune system, such as prednisone, other steroids, or anticancer drugs; drugs for the treatment of rheumatoid arthritis, Crohn s disease, or psoriasis; or have you had radiation treatments?   No   Have you had a seizure, or a brain or other nervous system problem?   No   During the past year, have you received a transfusion of blood or blood    products, or been given immune (gamma) globulin or antiviral drug?   No   For women: Are you pregnant or is there a chance you could become       pregnant during the next month?   No   Have you received any vaccinations in the past 4 weeks?   No     Immunization questionnaire answers were all negative.    I have reviewed the following standing orders:   This patient is due and qualifies for the Influenza vaccine.    Click here for Influenza Vaccine Standing Order    I have reviewed the vaccines inclusion and exclusion criteria; No concerns regarding eligibility.     Patient instructed to remain in clinic for 15 minutes afterwards, and to report any adverse reactions.     Screening performed by  Alba Reagan MA on 11/27/2023 at 1:20 PM.

## 2024-01-16 ENCOUNTER — OFFICE VISIT (OUTPATIENT)
Dept: FAMILY MEDICINE | Facility: CLINIC | Age: 89
End: 2024-01-16
Payer: MEDICARE

## 2024-01-16 VITALS
HEIGHT: 67 IN | RESPIRATION RATE: 16 BRPM | SYSTOLIC BLOOD PRESSURE: 130 MMHG | BODY MASS INDEX: 28.47 KG/M2 | HEART RATE: 76 BPM | OXYGEN SATURATION: 99 % | DIASTOLIC BLOOD PRESSURE: 80 MMHG | WEIGHT: 181.4 LBS | TEMPERATURE: 97.6 F

## 2024-01-16 DIAGNOSIS — J30.2 SEASONAL ALLERGIC RHINITIS, UNSPECIFIED TRIGGER: ICD-10-CM

## 2024-01-16 DIAGNOSIS — N18.32 STAGE 3B CHRONIC KIDNEY DISEASE (H): Primary | ICD-10-CM

## 2024-01-16 DIAGNOSIS — I71.40 ABDOMINAL AORTIC ANEURYSM (AAA) WITHOUT RUPTURE, UNSPECIFIED PART (H): ICD-10-CM

## 2024-01-16 DIAGNOSIS — G45.9 TIA (TRANSIENT ISCHEMIC ATTACK): ICD-10-CM

## 2024-01-16 PROBLEM — R19.7 DIARRHEA: Status: RESOLVED | Noted: 2020-08-24 | Resolved: 2024-01-16

## 2024-01-16 PROBLEM — R05.9 COUGH: Status: RESOLVED | Noted: 2019-10-09 | Resolved: 2024-01-16

## 2024-01-16 LAB
ALBUMIN MFR UR ELPH: 10.9 MG/DL
ALBUMIN SERPL BCG-MCNC: 4.5 G/DL (ref 3.5–5.2)
ALBUMIN UR-MCNC: NEGATIVE MG/DL
ANION GAP SERPL CALCULATED.3IONS-SCNC: 10 MMOL/L (ref 7–15)
APPEARANCE UR: CLEAR
BACTERIA #/AREA URNS HPF: ABNORMAL /HPF
BILIRUB UR QL STRIP: NEGATIVE
BUN SERPL-MCNC: 19.7 MG/DL (ref 8–23)
CALCIUM SERPL-MCNC: 9.5 MG/DL (ref 8.2–9.6)
CHLORIDE SERPL-SCNC: 103 MMOL/L (ref 98–107)
COLOR UR AUTO: YELLOW
CREAT SERPL-MCNC: 1.46 MG/DL (ref 0.67–1.17)
CREAT UR-MCNC: 90.2 MG/DL
CREAT UR-MCNC: 90.2 MG/DL
DEPRECATED HCO3 PLAS-SCNC: 24 MMOL/L (ref 22–29)
EGFRCR SERPLBLD CKD-EPI 2021: 44 ML/MIN/1.73M2
FERRITIN SERPL-MCNC: 224 NG/ML (ref 31–409)
GLUCOSE SERPL-MCNC: 115 MG/DL (ref 70–99)
GLUCOSE UR STRIP-MCNC: NEGATIVE MG/DL
HGB BLD-MCNC: 13 G/DL (ref 13.3–17.7)
HGB UR QL STRIP: NEGATIVE
HYALINE CASTS #/AREA URNS LPF: ABNORMAL /LPF
IRON BINDING CAPACITY (ROCHE): 274 UG/DL (ref 240–430)
IRON SATN MFR SERPL: 22 % (ref 15–46)
IRON SERPL-MCNC: 59 UG/DL (ref 61–157)
KETONES UR STRIP-MCNC: NEGATIVE MG/DL
LEUKOCYTE ESTERASE UR QL STRIP: NEGATIVE
MICROALBUMIN UR-MCNC: 12.3 MG/L
MICROALBUMIN/CREAT UR: 13.64 MG/G CR (ref 0–17)
NITRATE UR QL: NEGATIVE
PH UR STRIP: 5.5 [PH] (ref 5–7)
PHOSPHATE SERPL-MCNC: 3 MG/DL (ref 2.5–4.5)
POTASSIUM SERPL-SCNC: 5.1 MMOL/L (ref 3.4–5.3)
PROT/CREAT 24H UR: 0.12 MG/MG CR (ref 0–0.2)
PTH-INTACT SERPL-MCNC: 47 PG/ML (ref 15–65)
RBC #/AREA URNS AUTO: ABNORMAL /HPF
SODIUM SERPL-SCNC: 137 MMOL/L (ref 135–145)
SP GR UR STRIP: 1.01 (ref 1–1.03)
SQUAMOUS #/AREA URNS AUTO: ABNORMAL /LPF
UROBILINOGEN UR STRIP-ACNC: 0.2 E.U./DL
WBC #/AREA URNS AUTO: ABNORMAL /HPF

## 2024-01-16 PROCEDURE — 82570 ASSAY OF URINE CREATININE: CPT | Performed by: FAMILY MEDICINE

## 2024-01-16 PROCEDURE — 82043 UR ALBUMIN QUANTITATIVE: CPT | Performed by: FAMILY MEDICINE

## 2024-01-16 PROCEDURE — 85018 HEMOGLOBIN: CPT | Mod: QW | Performed by: FAMILY MEDICINE

## 2024-01-16 PROCEDURE — 80069 RENAL FUNCTION PANEL: CPT | Performed by: FAMILY MEDICINE

## 2024-01-16 PROCEDURE — 82728 ASSAY OF FERRITIN: CPT | Performed by: FAMILY MEDICINE

## 2024-01-16 PROCEDURE — 84156 ASSAY OF PROTEIN URINE: CPT | Performed by: FAMILY MEDICINE

## 2024-01-16 PROCEDURE — 83970 ASSAY OF PARATHORMONE: CPT | Performed by: FAMILY MEDICINE

## 2024-01-16 PROCEDURE — 83550 IRON BINDING TEST: CPT | Performed by: FAMILY MEDICINE

## 2024-01-16 PROCEDURE — 83540 ASSAY OF IRON: CPT | Performed by: FAMILY MEDICINE

## 2024-01-16 PROCEDURE — 36415 COLL VENOUS BLD VENIPUNCTURE: CPT | Performed by: FAMILY MEDICINE

## 2024-01-16 PROCEDURE — 99215 OFFICE O/P EST HI 40 MIN: CPT | Performed by: FAMILY MEDICINE

## 2024-01-16 PROCEDURE — 81001 URINALYSIS AUTO W/SCOPE: CPT | Performed by: FAMILY MEDICINE

## 2024-01-16 RX ORDER — FLUTICASONE PROPIONATE 50 MCG
1 SPRAY, SUSPENSION (ML) NASAL DAILY
Qty: 18.2 ML | Refills: 3 | Status: SHIPPED | OUTPATIENT
Start: 2024-01-16

## 2024-01-16 NOTE — PROGRESS NOTES
"  Assessment & Plan     Stage 3b chronic kidney disease (H)  Not currently following, would like labs checked  - Albumin level; Future  - Albumin Random Urine Quantitative with Creat Ratio; Future  - Basic metabolic panel; Future  - IRON; Standing  - IRON AND IRON BINDING CAPACITY; Standing  - FERRITIN; Standing  - Parathyroid Hormone Intact; Future  - Phosphorus; Future  - Protein  random urine; Future  - Protein timed urine; Future  - UA with Microscopic; Future  - Hemoglobin; Future  - Ferritin; Future    Abdominal aortic aneurysm (AAA) without rupture, unspecified part (H24)  History of stable, declines treatment    TIA (transient ischemic attack)  Now say was remote and controlled on ASA.  Will continue ASA declines further work up.    Seasonal allergic rhinitis, unspecified trigger  Worse last 3 weeks  - fluticasone (FLONASE) 50 MCG/ACT nasal spray; Spray 1 spray into both nostrils daily      40 minutes spent by me on the date of the encounter doing chart review, history and exam, documentation and further activities per the note       BMI:   Estimated body mass index is 28.84 kg/m  as calculated from the following:    Height as of this encounter: 1.689 m (5' 6.5\").    Weight as of this encounter: 82.3 kg (181 lb 6.4 oz).       F/U Dr. Gilbert as needed patient declines.    Fercho Harp MD  [facility]    Maren Maldonado is a 89 year old, presenting for the following health issues:  Sinus Problem (Sinus congestion/Ischemia attack concerns )        1/16/2024     7:55 AM   Additional Questions   Roomed by STEPHANIE Manuel       Sinus Problem     History of Present Illness       Headaches:   Since the patient's last clinic visit, headaches are: worsened  The patient is getting headaches:  Every day  He is able to do normal daily activities when he has a migraine.  The patient is taking the following rescue/relief medications:  No rescue/relief medications   Patient states \"I " "get some relief\" from the rescue/relief medications.   The patient is taking the following medications to prevent migraines:  No medications to prevent migraines  In the past 4 weeks, the patient has gone to an Urgent Care or Emergency Room 0 times times due to headaches.He consumes 0 sweetened beverage(s) daily.He exercises with enough effort to increase his heart rate 9 or less minutes per day.  He exercises with enough effort to increase his heart rate 3 or less days per week.   He is taking medications regularly.     Also states 3 weeks ago had an episode of brief blindness and inability to speak for less than a minute.  Also felt weak at the time.      Also follow up CKD, normally followed at [place].  Last GFR 7/23 was 42        Review of Systems   CONSTITUTIONAL:NEGATIVE for fever, chills, change in weight  ENT/MOUTH: NEGATIVE for ear, mouth and throat problems and POSITIVE for nasal congestion, postnasal drainage, and rhinorrhea-purulent  NEURO: NEGATIVE for weakness, dizziness or paresthesias and POSITIVE for Episode as described above.      Objective    BP (!) 155/86 (BP Location: Right arm, Patient Position: Sitting, Cuff Size: Adult Large)   Pulse 76   Temp 97.6  F (36.4  C) (Oral)   Resp 16   Ht 1.689 m (5' 6.5\")   Wt 82.3 kg (181 lb 6.4 oz)   SpO2 99%   BMI 28.84 kg/m    Body mass index is 28.84 kg/m .  Physical Exam   GENERAL: healthy, alert and no distress  HENT: normal cephalic/atraumatic, ear canals and TM's normal, nose and mouth without ulcers or lesions, nasal mucosa edematous , rhinorrhea clear, oropharynx clear, and oral mucous membranes moist  NECK: no adenopathy, no asymmetry, masses, or scars and thyroid normal to palpation  RESP: lungs clear to auscultation - no rales, rhonchi or wheezes  CV: regular rate and rhythm, normal S1 S2, no S3 or S4, no murmur, click or rub, no peripheral edema and peripheral pulses strong  ABDOMEN: soft, nontender, no hepatosplenomegaly, no masses and bowel " sounds normal  MS: no gross musculoskeletal defects noted, no edema

## 2024-01-16 NOTE — LETTER
January 17, 2024      Erica Cortez Sr.  [address] [address]        Dear ,    We are writing to inform you of your test results.    Your test results fall within the expected range(s) or remain unchanged from previous results.  Please continue with current treatment plan.  Your renal failure is stable.    Resulted Orders   Albumin level   Result Value Ref Range    Albumin 4.5 3.5 - 5.2 g/dL   Albumin Random Urine Quantitative with Creat Ratio   Result Value Ref Range    Creatinine Urine mg/dL 90.2 mg/dL      Comment:      The reference ranges have not been established in urine creatinine. The results should be integrated into the clinical context for interpretation.  The reference ranges have not been established in urine creatinine. The results should be integrated into the clinical context for interpretation.    Albumin Urine mg/L 12.3 mg/L      Comment:      The reference ranges have not been established in urine albumin. The results should be integrated into the clinical context for interpretation.    Albumin Urine mg/g Cr 13.64 0.00 - 17.00 mg/g Cr      Comment:      Microalbuminuria is defined as an albumin:creatinine ratio of 17 to 299 for males and 25 to 299 for females. A ratio of albumin:creatinine of 300 or higher is indicative of overt proteinuria.  Due to biologic variability, positive results should be confirmed by a second, first-morning random or 24-hour timed urine specimen. If there is discrepancy, a third specimen is recommended. When 2 out of 3 results are in the microalbuminuria range, this is evidence for incipient nephropathy and warrants increased efforts at glucose control, blood pressure control, and institution of therapy with an angiotensin-converting-enzyme (ACE) inhibitor (if the patient can tolerate it).     Basic metabolic panel   Result Value Ref Range    Sodium 137 135 - 145 mmol/L      Comment:      Reference intervals for this test were  updated on 09/26/2023 to more accurately reflect our healthy population. There may be differences in the flagging of prior results with similar values performed with this method. Interpretation of those prior results can be made in the context of the updated reference intervals.     Potassium 5.1 3.4 - 5.3 mmol/L    Chloride 103 98 - 107 mmol/L    Carbon Dioxide (CO2) 24 22 - 29 mmol/L    Anion Gap 10 7 - 15 mmol/L    Urea Nitrogen 19.7 8.0 - 23.0 mg/dL    Creatinine 1.46 (H) 0.67 - 1.17 mg/dL    GFR Estimate 44 (L) >60 mL/min/1.73m2    Calcium 9.5 8.2 - 9.6 mg/dL    Glucose 115 (H) 70 - 99 mg/dL   IRON AND IRON BINDING CAPACITY   Result Value Ref Range    Iron 59 (L) 61 - 157 ug/dL    Iron Binding Capacity 274 240 - 430 ug/dL    Iron Sat Index 22 15 - 46 %   FERRITIN   Result Value Ref Range    Ferritin 224 31 - 409 ng/mL   Parathyroid Hormone Intact   Result Value Ref Range    Parathyroid Hormone Intact 47 15 - 65 pg/mL    Narrative    This result was obtained with the Roche Elecsys PTH STAT assay.   This reference range differs from PTH assays used in other [facility] laboratories.   Phosphorus   Result Value Ref Range    Phosphorus 3.0 2.5 - 4.5 mg/dL   Protein  random urine   Result Value Ref Range    Total Protein Urine mg/dL 10.9   mg/dL      Comment:      The reference ranges have not been established in urine protein. The results should be integrated into the clinical context for interpretation.    Total Protein Urine mg/mg Creat 0.12 0.00 - 0.20 mg/mg Cr    Creatinine Urine mg/dL 90.2 mg/dL      Comment:      The reference ranges have not been established in urine creatinine. The results should be integrated into the clinical context for interpretation.   UA with Microscopic   Result Value Ref Range    Color Urine Yellow Colorless, Straw, Light Yellow, Yellow    Appearance Urine Clear Clear    Glucose Urine Negative Negative mg/dL    Bilirubin Urine Negative Negative    Ketones Urine Negative  Negative mg/dL    Specific Gravity Urine 1.015 1.003 - 1.035    Blood Urine Negative Negative    pH Urine 5.5 5.0 - 7.0    Protein Albumin Urine Negative Negative mg/dL    Urobilinogen Urine 0.2 0.2, 1.0 E.U./dL    Nitrite Urine Negative Negative    Leukocyte Esterase Urine Negative Negative   Hemoglobin   Result Value Ref Range    Hemoglobin 13.0 (L) 13.3 - 17.7 g/dL   Urine Microscopic Exam   Result Value Ref Range    Bacteria Urine Few (A) None Seen /HPF    RBC Urine 0-2 0-2 /HPF /HPF    WBC Urine 0-5 0-5 /HPF /HPF    Squamous Epithelials Urine Few (A) None Seen /LPF    Hyaline Casts Urine 0-2 (A) None Seen /LPF       If you have any questions or concerns, please call the clinic at the number listed above.       Sincerely,      Fercho Harp MD

## 2024-03-10 ENCOUNTER — HEALTH MAINTENANCE LETTER (OUTPATIENT)
Age: 89
End: 2024-03-10

## 2024-11-13 ENCOUNTER — IMMUNIZATION (OUTPATIENT)
Dept: FAMILY MEDICINE | Facility: CLINIC | Age: 89
End: 2024-11-13
Payer: MEDICARE

## 2024-11-13 DIAGNOSIS — Z23 NEED FOR VACCINATION: Primary | ICD-10-CM

## 2024-11-13 PROCEDURE — 90662 IIV NO PRSV INCREASED AG IM: CPT

## 2024-11-13 PROCEDURE — G0008 ADMIN INFLUENZA VIRUS VAC: HCPCS

## 2025-03-16 ENCOUNTER — HEALTH MAINTENANCE LETTER (OUTPATIENT)
Age: OVER 89
End: 2025-03-16

## 2025-04-23 ENCOUNTER — TELEPHONE (OUTPATIENT)
Dept: FAMILY MEDICINE | Facility: CLINIC | Age: OVER 89
End: 2025-04-23

## 2025-04-23 ENCOUNTER — OFFICE VISIT (OUTPATIENT)
Dept: FAMILY MEDICINE | Facility: CLINIC | Age: OVER 89
End: 2025-04-23
Payer: MEDICARE

## 2025-04-23 VITALS
WEIGHT: 184 LBS | HEART RATE: 83 BPM | OXYGEN SATURATION: 99 % | BODY MASS INDEX: 28.88 KG/M2 | DIASTOLIC BLOOD PRESSURE: 78 MMHG | RESPIRATION RATE: 16 BRPM | TEMPERATURE: 98.1 F | HEIGHT: 67 IN | SYSTOLIC BLOOD PRESSURE: 164 MMHG

## 2025-04-23 DIAGNOSIS — Z13.6 SCREENING FOR CARDIOVASCULAR CONDITION: ICD-10-CM

## 2025-04-23 DIAGNOSIS — R35.0 BENIGN PROSTATIC HYPERPLASIA WITH URINARY FREQUENCY: ICD-10-CM

## 2025-04-23 DIAGNOSIS — I25.10 ARTERIOSCLEROSIS OF CORONARY ARTERY: ICD-10-CM

## 2025-04-23 DIAGNOSIS — Z00.00 HEALTH CARE MAINTENANCE: ICD-10-CM

## 2025-04-23 DIAGNOSIS — I10 ESSENTIAL HYPERTENSION: ICD-10-CM

## 2025-04-23 DIAGNOSIS — N40.1 BENIGN PROSTATIC HYPERPLASIA WITH URINARY FREQUENCY: ICD-10-CM

## 2025-04-23 DIAGNOSIS — N18.32 STAGE 3B CHRONIC KIDNEY DISEASE (H): Primary | ICD-10-CM

## 2025-04-23 LAB — HGB BLD-MCNC: 12.5 G/DL (ref 13.3–17.7)

## 2025-04-23 PROCEDURE — 80061 LIPID PANEL: CPT | Performed by: INTERNAL MEDICINE

## 2025-04-23 PROCEDURE — 99214 OFFICE O/P EST MOD 30 MIN: CPT | Performed by: INTERNAL MEDICINE

## 2025-04-23 PROCEDURE — 80048 BASIC METABOLIC PNL TOTAL CA: CPT | Performed by: INTERNAL MEDICINE

## 2025-04-23 PROCEDURE — 3077F SYST BP >= 140 MM HG: CPT | Performed by: INTERNAL MEDICINE

## 2025-04-23 PROCEDURE — 85018 HEMOGLOBIN: CPT | Mod: QW | Performed by: INTERNAL MEDICINE

## 2025-04-23 PROCEDURE — 82570 ASSAY OF URINE CREATININE: CPT | Performed by: INTERNAL MEDICINE

## 2025-04-23 PROCEDURE — 36415 COLL VENOUS BLD VENIPUNCTURE: CPT | Performed by: INTERNAL MEDICINE

## 2025-04-23 PROCEDURE — 3078F DIAST BP <80 MM HG: CPT | Performed by: INTERNAL MEDICINE

## 2025-04-23 NOTE — TELEPHONE ENCOUNTER
Patient Quality Outreach    Patient is due for the following:   Physical Annual Wellness Visit    Action(s) Taken:   No follow up needed at this time. Pt seen by Dr. Gilbert 4/23/25.    Type of outreach:    Chart review performed, no outreach needed.    Questions for provider review:    None         Alexa Blackman  Chart routed to None.

## 2025-04-23 NOTE — ASSESSMENT & PLAN NOTE
Baseline serum creatinine 1.5-1.7; estimated GFR approximately 40 mL/min  -Relatively stable trends in kidney function observed through [facility]  -Here for semiannual monitoring more locally  -Discussed clinical importance of GFR and kidney function.  Given relative stability, his risk for significant progression of kidney disease is quite low.  Advocating continued semiannual monitoring between both our local clinic and through his [place] providers

## 2025-04-23 NOTE — ASSESSMENT & PLAN NOTE
Maintained on finasteride and tamsulosin -stable lower urinary tract symptoms overall  -Does share frequent imbalance, lightheadedness feelings -potentially some contributors from his medication

## 2025-04-23 NOTE — TELEPHONE ENCOUNTER
General Call    Contacts       Contact Date/Time Type Contact Phone/Fax    04/23/2025 07:28 AM CDT Phone (Incoming) Erica Cortez Sr. (Self) [phone] (H)          Reason for Call:  Upcoming Appt    What are your questions or concerns:  Should pt be fasting for visit with Dr. Gilbert today? Please reach out.     Date of last appointment with provider: 1/16/2024    [place] or would you prefer to receive a phone call?:   Patient would prefer a phone call   Okay to leave a detailed message?: Yes at Home number on file [phone] (home)

## 2025-04-23 NOTE — ASSESSMENT & PLAN NOTE
Controlled; historically  current antihypertensive regimen: Amlodipine 2.5 mg daily, Toprol-XL 75 mg daily  regimen changes:   intolerance: ACEi (hyperkalemia; associated hypotension)  future titration/work-up plan:

## 2025-04-23 NOTE — PATIENT INSTRUCTIONS
Obtain kidney function testing today.  Anticipate seeing you back in 1 year.  Plan and continue to follow with [facility] annually in the fall at which point they can draw your semiannual kidney function testing then

## 2025-04-23 NOTE — PROGRESS NOTES
"  Assessment & Plan   Problem List Items Addressed This Visit          Circulatory    Essential hypertension     Controlled; historically  current antihypertensive regimen: Amlodipine 2.5 mg daily, Toprol-XL 75 mg daily  regimen changes:   intolerance: ACEi (hyperkalemia; associated hypotension)  future titration/work-up plan:           Arteriosclerosis of coronary artery    Relevant Orders    Lipid panel reflex to direct LDL Non-fasting       Urinary    Benign prostatic hyperplasia     Maintained on finasteride and tamsulosin -stable lower urinary tract symptoms overall  -Does share frequent imbalance, lightheadedness feelings -potentially some contributors from his medication         Stage 3b chronic kidney disease (H) - Primary     Baseline serum creatinine 1.5-1.7; estimated GFR approximately 40 mL/min  -Relatively stable trends in kidney function observed through [facility]  -Here for semiannual monitoring more locally  -Discussed clinical importance of GFR and kidney function.  Given relative stability, his risk for significant progression of kidney disease is quite low.  Advocating continued semiannual monitoring between both our local clinic and through his [place] providers         Relevant Orders    Albumin Random Urine Quantitative with Creat Ratio    BASIC METABOLIC PANEL    Hemoglobin     Other Visit Diagnoses       Screening for cardiovascular condition        Health care maintenance        Relevant Orders    REVIEW OF HEALTH MAINTENANCE PROTOCOL ORDERS (Completed)                  BMI  Estimated body mass index is 29.25 kg/m  as calculated from the following:    Height as of this encounter: 1.689 m (5' 6.5\").    Weight as of this encounter: 83.5 kg (184 lb).       Follow-up    Follow-up Visit   Expected date:  Apr 23, 2026 (Approximate)      Follow Up Appointment Details:     Follow-up with whom?: Me    Follow-Up for what?: Chronic Disease f/u    Chronic Disease f/u: CKD    How?: In Person             " "    Maren Maldonado is a 89 year old, presenting for the following health issues: Presents to clinic for follow-up.  Living independently here in town.  Still doctors through primarily [facility], though his frequency of trips has been diminishing through recent years.  Last seen there this past fall.  He brings documentation results of that visit for review.  States has been doing quite well.  Cheerfully shares that his 90s have been an easier time in his 80s.  Feels well generally.  Staying independent with his activities of daily living.  Provides copies of his [place] labs in the fall demonstrating GFR 36 mL/min, creatinine of 1.7.  No recent medication changes.  Here for semiannual monitoring of CKD.  Kidney Problem        4/23/2025    12:40 PM   Additional Questions   Roomed by Sherry MCGEE     Kidney Problem  This is a chronic problem. The current episode started more than 1 year ago. The problem occurs constantly.   History of Present Illness       CKD: He uses over the counter pain medication, including Vitamin    50+ qd.      D3 1000 Unit qd, one time daily.    He eats 0-1 servings of fruits and vegetables daily.He consumes 0 sweetened beverage(s) daily.He exercises with enough effort to increase his heart rate 9 or less minutes per day.  He exercises with enough effort to increase his heart rate 3 or less days per week.   He is taking medications regularly.          Review of Systems  Constitutional, HEENT, cardiovascular, pulmonary, gi and gu systems are negative, except as otherwise noted.      Objective    BP (!) 164/78   Pulse 83   Temp 98.1  F (36.7  C)   Resp 16   Ht 1.689 m (5' 6.5\")   Wt 83.5 kg (184 lb)   SpO2 99%   BMI 29.25 kg/m    Body mass index is 29.25 kg/m .  Physical Exam   GENERAL: alert and no distress  NECK: no adenopathy, no asymmetry, masses, or scars  RESP: lungs clear to auscultation - no rales, rhonchi or wheezes  CV: regular rate and rhythm, normal S1 S2, no S3 or S4, " no murmur, click or rub, no peripheral edema  ABDOMEN: soft, nontender, no hepatosplenomegaly, no masses and bowel sounds normal  MS: no gross musculoskeletal defects noted, no edema            Signed Electronically by: Augustine Gilbert MD

## 2025-04-23 NOTE — TELEPHONE ENCOUNTER
Call with pt and informed pt does not need to be fasting for his appointment today. Pt expressed appreciation for the call.

## 2025-04-24 LAB
ANION GAP SERPL CALCULATED.3IONS-SCNC: 8 MMOL/L (ref 7–15)
BUN SERPL-MCNC: 24.6 MG/DL (ref 8–23)
CALCIUM SERPL-MCNC: 9.4 MG/DL (ref 8.8–10.4)
CHLORIDE SERPL-SCNC: 106 MMOL/L (ref 98–107)
CHOLEST SERPL-MCNC: 122 MG/DL
CREAT SERPL-MCNC: 1.52 MG/DL (ref 0.67–1.17)
CREAT UR-MCNC: 174 MG/DL
EGFRCR SERPLBLD CKD-EPI 2021: 42 ML/MIN/1.73M2
FASTING STATUS PATIENT QL REPORTED: YES
FASTING STATUS PATIENT QL REPORTED: YES
GLUCOSE SERPL-MCNC: 101 MG/DL (ref 70–99)
HCO3 SERPL-SCNC: 25 MMOL/L (ref 22–29)
HDLC SERPL-MCNC: 42 MG/DL
LDLC SERPL CALC-MCNC: 63 MG/DL
MICROALBUMIN UR-MCNC: 24.9 MG/L
MICROALBUMIN/CREAT UR: 14.31 MG/G CR (ref 0–17)
NONHDLC SERPL-MCNC: 80 MG/DL
POTASSIUM SERPL-SCNC: 4.9 MMOL/L (ref 3.4–5.3)
SODIUM SERPL-SCNC: 139 MMOL/L (ref 135–145)
TRIGL SERPL-MCNC: 83 MG/DL